# Patient Record
Sex: MALE | Race: WHITE
[De-identification: names, ages, dates, MRNs, and addresses within clinical notes are randomized per-mention and may not be internally consistent; named-entity substitution may affect disease eponyms.]

---

## 2017-03-23 ENCOUNTER — HOSPITAL ENCOUNTER (OUTPATIENT)
Dept: HOSPITAL 83 - LAB | Age: 27
Discharge: HOME | End: 2017-03-23
Attending: INTERNAL MEDICINE
Payer: COMMERCIAL

## 2017-03-23 DIAGNOSIS — F33.1: ICD-10-CM

## 2017-03-23 DIAGNOSIS — E10.9: Primary | ICD-10-CM

## 2017-03-23 LAB
ALBUMIN SERPL-MCNC: 3.7 GM/DL (ref 3.1–4.5)
ALP SERPL-CCNC: 64 U/L (ref 45–117)
ALT SERPL W P-5'-P-CCNC: 21 U/L (ref 12–78)
AST SERPL-CCNC: 16 IU/L (ref 3–35)
BASOPHILS # BLD AUTO: 0 10*3/UL (ref 0–0.1)
BASOPHILS NFR BLD AUTO: 0.3 % (ref 0–1)
BUN SERPL-MCNC: 12 MG/DL (ref 7–24)
CHLORIDE SERPL-SCNC: 99 MMOL/L (ref 98–107)
CHOLEST SERPL-MCNC: 126 MG/DL (ref ?–200)
CO2 SERPL-SCNC: 29 MMOL/L (ref 21–32)
EOSINOPHIL # BLD AUTO: 0 10*3/UL (ref 0–0.4)
EOSINOPHIL # BLD AUTO: 0.1 % (ref 1–4)
ERYTHROCYTE [DISTWIDTH] IN BLOOD BY AUTOMATED COUNT: 12.5 % (ref 0–14.5)
EST. AVERAGE GLUCOSE BLD GHB EST-MCNC: 206 MG/DL
GLUCOSE SERPL-MCNC: 451 MG/DL (ref 65–99)
HCT VFR BLD AUTO: 44.1 % (ref 42–52)
HDLC SERPL-MCNC: 70 MG/DL (ref 40–60)
HGB BLD-MCNC: 14.4 G/DL (ref 14–18)
IG #: 0.1 10*3/UL (ref 0–0.1)
LDLC SERPL DIRECT ASSAY-MCNC: 41 MG/DL (ref 9–159)
LYMPHOCYTES # BLD AUTO: 1 10*3/UL (ref 1.3–4.4)
LYMPHOCYTES NFR BLD AUTO: 7.1 % (ref 27–41)
MCH RBC QN AUTO: 29.3 PG (ref 27–31)
MCHC RBC AUTO-ENTMCNC: 32.7 G/DL (ref 33–37)
MCV RBC AUTO: 89.6 FL (ref 80–94)
MONOCYTES # BLD AUTO: 0.4 10*3/UL (ref 0.1–1)
MONOCYTES NFR BLD MANUAL: 3.1 % (ref 3–9)
NEUT #: 12.8 10*3/UL (ref 2.3–7.9)
NEUT %: 89 % (ref 47–73)
NRBC BLD QL AUTO: 0 % (ref 0–0)
PLATELET # BLD AUTO: 236 10*3/UL (ref 130–400)
PMV BLD AUTO: 9.4 FL (ref 9.6–12.3)
POTASSIUM SERPL-SCNC: 4.2 MMOL/L (ref 3.5–5.1)
PROT SERPL-MCNC: 6.7 GM/DL (ref 6.4–8.2)
RBC # BLD AUTO: 4.92 10*6/UL (ref 4.5–5.9)
SODIUM SERPL-SCNC: 137 MMOL/L (ref 136–145)
TRIGL SERPL-MCNC: 77 MG/DL (ref ?–150)
TSH SERPL DL<=0.005 MIU/L-ACNC: 1.11 UIU/ML (ref 0.36–4.75)
VLDLC SERPL CALC-MCNC: 15 MG/DL (ref 6–40)
WBC NRBC COR # BLD AUTO: 14.4 10*3/UL (ref 4.8–10.8)

## 2017-09-18 ENCOUNTER — HOSPITAL ENCOUNTER (INPATIENT)
Dept: HOSPITAL 83 - ED | Age: 27
LOS: 1 days | Discharge: HOME | DRG: 638 | End: 2017-09-19
Attending: INTERNAL MEDICINE | Admitting: INTERNAL MEDICINE
Payer: COMMERCIAL

## 2017-09-18 VITALS — DIASTOLIC BLOOD PRESSURE: 81 MMHG | SYSTOLIC BLOOD PRESSURE: 136 MMHG

## 2017-09-18 VITALS — DIASTOLIC BLOOD PRESSURE: 72 MMHG

## 2017-09-18 VITALS — SYSTOLIC BLOOD PRESSURE: 125 MMHG | DIASTOLIC BLOOD PRESSURE: 85 MMHG

## 2017-09-18 VITALS — HEIGHT: 65 IN | WEIGHT: 134 LBS | BODY MASS INDEX: 22.33 KG/M2

## 2017-09-18 VITALS — DIASTOLIC BLOOD PRESSURE: 69 MMHG | SYSTOLIC BLOOD PRESSURE: 129 MMHG

## 2017-09-18 VITALS — DIASTOLIC BLOOD PRESSURE: 85 MMHG

## 2017-09-18 VITALS — DIASTOLIC BLOOD PRESSURE: 79 MMHG

## 2017-09-18 DIAGNOSIS — E87.1: ICD-10-CM

## 2017-09-18 DIAGNOSIS — Z79.84: ICD-10-CM

## 2017-09-18 DIAGNOSIS — E87.8: ICD-10-CM

## 2017-09-18 DIAGNOSIS — E10.65: Primary | ICD-10-CM

## 2017-09-18 DIAGNOSIS — E80.6: ICD-10-CM

## 2017-09-18 DIAGNOSIS — Z79.899: ICD-10-CM

## 2017-09-18 DIAGNOSIS — E86.0: ICD-10-CM

## 2017-09-18 DIAGNOSIS — R00.0: ICD-10-CM

## 2017-09-18 LAB
ALBUMIN SERPL-MCNC: 4.7 GM/DL (ref 3.1–4.5)
ALP SERPL-CCNC: 83 U/L (ref 45–117)
ALT SERPL W P-5'-P-CCNC: 18 U/L (ref 12–78)
AST SERPL-CCNC: 15 IU/L (ref 3–35)
BASOPHILS # BLD AUTO: 0 10*3/UL (ref 0–0.1)
BASOPHILS NFR BLD AUTO: 0.5 % (ref 0–1)
BUN SERPL-MCNC: 24 MG/DL (ref 7–24)
CHLORIDE SERPL-SCNC: 94 MMOL/L (ref 98–107)
CREAT SERPL-MCNC: 1.17 MG/DL (ref 0.7–1.3)
EOSINOPHIL # BLD AUTO: 0 10*3/UL (ref 0–0.4)
EOSINOPHIL # BLD AUTO: 0.1 % (ref 1–4)
ERYTHROCYTE [DISTWIDTH] IN BLOOD BY AUTOMATED COUNT: 12.4 % (ref 0–14.5)
HCT VFR BLD AUTO: 46.4 % (ref 42–52)
HGB BLD-MCNC: 15.4 G/DL (ref 14–18)
LIPASE SERPL-CCNC: 75 U/L (ref 73–393)
LYMPHOCYTES # BLD AUTO: 1.1 10*3/UL (ref 1.3–4.4)
LYMPHOCYTES NFR BLD AUTO: 14.1 % (ref 27–41)
MCH RBC QN AUTO: 28.8 PG (ref 27–31)
MCHC RBC AUTO-ENTMCNC: 33.2 G/DL (ref 33–37)
MCV RBC AUTO: 86.9 FL (ref 80–94)
MONOCYTES # BLD AUTO: 0.4 10*3/UL (ref 0.1–1)
MONOCYTES NFR BLD MANUAL: 4.7 % (ref 3–9)
NEUT #: 6.4 10*3/UL (ref 2.3–7.9)
NEUT %: 80.2 % (ref 47–73)
NRBC BLD QL AUTO: 0 10*3/UL (ref 0–0)
PLATELET # BLD AUTO: 250 10*3/UL (ref 130–400)
PMV BLD AUTO: 9.8 FL (ref 9.6–12.3)
POTASSIUM SERPL-SCNC: 5 MMOL/L (ref 3.5–5.1)
PROT SERPL-MCNC: 8.3 GM/DL (ref 6.4–8.2)
RBC # BLD AUTO: 5.34 10*6/UL (ref 4.5–5.9)
SODIUM SERPL-SCNC: 134 MMOL/L (ref 136–145)
WBC NRBC COR # BLD AUTO: 8 10*3/UL (ref 4.8–10.8)

## 2017-09-19 VITALS — DIASTOLIC BLOOD PRESSURE: 71 MMHG

## 2017-09-19 VITALS — SYSTOLIC BLOOD PRESSURE: 110 MMHG | DIASTOLIC BLOOD PRESSURE: 72 MMHG

## 2017-09-19 LAB
25(OH)D3 SERPL-MCNC: 27.2 NG/ML (ref 30–100)
ALBUMIN SERPL-MCNC: 3.3 GM/DL (ref 3.1–4.5)
ALP SERPL-CCNC: 67 U/L (ref 45–117)
ALT SERPL W P-5'-P-CCNC: 15 U/L (ref 12–78)
APPEARANCE UR: CLEAR
APTT PPP: 24 SECONDS (ref 20.8–31.5)
AST SERPL-CCNC: 11 IU/L (ref 3–35)
BASOPHILS # BLD AUTO: 0 10*3/UL (ref 0–0.1)
BASOPHILS NFR BLD AUTO: 0.4 % (ref 0–1)
BILIRUB UR QL STRIP: NEGATIVE
BUN SERPL-MCNC: 14 MG/DL (ref 7–24)
CHLORIDE SERPL-SCNC: 103 MMOL/L (ref 98–107)
CHOLEST SERPL-MCNC: 93 MG/DL (ref ?–200)
COLOR UR: YELLOW
CREAT SERPL-MCNC: 0.9 MG/DL (ref 0.7–1.3)
EOSINOPHIL # BLD AUTO: 0 10*3/UL (ref 0–0.4)
EOSINOPHIL # BLD AUTO: 0.5 % (ref 1–4)
EPI CELLS #/AREA URNS HPF: (no result) /[HPF]
ERYTHROCYTE [DISTWIDTH] IN BLOOD BY AUTOMATED COUNT: 12.6 % (ref 0–14.5)
GLUCOSE UR QL: (no result)
HCT VFR BLD AUTO: 38.4 % (ref 42–52)
HDLC SERPL-MCNC: 47 MG/DL (ref 40–60)
HGB BLD-MCNC: 12.9 G/DL (ref 14–18)
HGB UR QL STRIP: NEGATIVE
INR BLD: 1.2 (ref 2–3.5)
KETONES UR QL STRIP: (no result)
LDLC SERPL DIRECT ASSAY-MCNC: 32 MG/DL (ref 9–159)
LEUKOCYTE ESTERASE UR QL STRIP: NEGATIVE
LYMPHOCYTES # BLD AUTO: 1.6 10*3/UL (ref 1.3–4.4)
LYMPHOCYTES NFR BLD AUTO: 21.2 % (ref 27–41)
MAGNESIUM SERPL-MCNC: 2 MG/DL (ref 1.5–2.1)
MCH RBC QN AUTO: 29.6 PG (ref 27–31)
MCHC RBC AUTO-ENTMCNC: 33.6 G/DL (ref 33–37)
MCV RBC AUTO: 88.1 FL (ref 80–94)
MONOCYTES # BLD AUTO: 0.4 10*3/UL (ref 0.1–1)
MONOCYTES NFR BLD MANUAL: 5.3 % (ref 3–9)
NEUT #: 5.4 10*3/UL (ref 2.3–7.9)
NEUT %: 72.3 % (ref 47–73)
NITRITE UR QL STRIP: NEGATIVE
NRBC BLD QL AUTO: 0 10*3/UL (ref 0–0)
PH UR STRIP: 5.5 [PH] (ref 5–9)
PHOSPHATE SERPL-MCNC: 2.3 MG/DL (ref 2.5–4.9)
PLATELET # BLD AUTO: 204 10*3/UL (ref 130–400)
PMV BLD AUTO: 9.5 FL (ref 9.6–12.3)
POTASSIUM SERPL-SCNC: 4.2 MMOL/L (ref 3.5–5.1)
PROT SERPL-MCNC: 5.9 GM/DL (ref 6.4–8.2)
RBC # BLD AUTO: 4.36 10*6/UL (ref 4.5–5.9)
SODIUM SERPL-SCNC: 136 MMOL/L (ref 136–145)
SP GR UR: 1.01 (ref 1–1.03)
TRIGL SERPL-MCNC: 70 MG/DL (ref ?–150)
TSH SERPL DL<=0.005 MIU/L-ACNC: 0.83 UIU/ML (ref 0.36–4.75)
UROBILINOGEN UR STRIP-MCNC: 0.2 E.U./DL (ref 0.2–1)
VITAMIN B12: 531 PG/ML (ref 247–911)
VLDLC SERPL CALC-MCNC: 14 MG/DL (ref 6–40)
WBC NRBC COR # BLD AUTO: 7.5 10*3/UL (ref 4.8–10.8)

## 2018-01-16 ENCOUNTER — HOSPITAL ENCOUNTER (EMERGENCY)
Dept: HOSPITAL 83 - ED | Age: 28
Discharge: HOME | End: 2018-01-16
Payer: COMMERCIAL

## 2018-01-16 VITALS — WEIGHT: 135 LBS | HEIGHT: 65.98 IN | BODY MASS INDEX: 21.69 KG/M2
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
## 2018-01-16 DIAGNOSIS — Z79.4: ICD-10-CM

## 2018-01-16 DIAGNOSIS — E78.00: ICD-10-CM
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
## 2018-01-16 DIAGNOSIS — E10.65: ICD-10-CM

## 2018-01-16 DIAGNOSIS — S40.012A: Primary | ICD-10-CM
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
## 2018-01-16 DIAGNOSIS — E87.1: ICD-10-CM

## 2018-01-16 DIAGNOSIS — Z79.899: ICD-10-CM

## 2018-02-25 ENCOUNTER — HOSPITAL ENCOUNTER (INPATIENT)
Dept: HOSPITAL 83 - ED | Age: 28
LOS: 1 days | Discharge: HOME | DRG: 637 | End: 2018-02-26
Attending: INTERNAL MEDICINE | Admitting: INTERNAL MEDICINE
Payer: COMMERCIAL

## 2018-02-25 VITALS — SYSTOLIC BLOOD PRESSURE: 131 MMHG | DIASTOLIC BLOOD PRESSURE: 77 MMHG

## 2018-02-25 VITALS — BODY MASS INDEX: 20.55 KG/M2 | WEIGHT: 127.9 LBS | HEIGHT: 65.98 IN

## 2018-02-25 VITALS — DIASTOLIC BLOOD PRESSURE: 80 MMHG | SYSTOLIC BLOOD PRESSURE: 138 MMHG

## 2018-02-25 VITALS — DIASTOLIC BLOOD PRESSURE: 85 MMHG

## 2018-02-25 VITALS — DIASTOLIC BLOOD PRESSURE: 94 MMHG

## 2018-02-25 DIAGNOSIS — Z79.4: ICD-10-CM

## 2018-02-25 DIAGNOSIS — R65.10: ICD-10-CM

## 2018-02-25 DIAGNOSIS — E87.1: ICD-10-CM

## 2018-02-25 DIAGNOSIS — E10.10: Primary | ICD-10-CM

## 2018-02-25 DIAGNOSIS — R00.0: ICD-10-CM

## 2018-02-25 DIAGNOSIS — N17.0: ICD-10-CM

## 2018-02-25 DIAGNOSIS — D72.829: ICD-10-CM

## 2018-02-25 DIAGNOSIS — Z79.899: ICD-10-CM

## 2018-02-25 DIAGNOSIS — F32.9: ICD-10-CM

## 2018-02-25 DIAGNOSIS — E87.5: ICD-10-CM

## 2018-02-25 DIAGNOSIS — D72.810: ICD-10-CM

## 2018-02-25 DIAGNOSIS — D64.9: ICD-10-CM

## 2018-02-25 LAB
ALBUMIN SERPL-MCNC: 5 GM/DL (ref 3.1–4.5)
ALP SERPL-CCNC: 106 U/L (ref 45–117)
ALT SERPL W P-5'-P-CCNC: 26 U/L (ref 12–78)
APPEARANCE UR: CLEAR
AST SERPL-CCNC: 22 IU/L (ref 3–35)
BASE EXCESS BLDA CALC-SCNC: -14.5 MMOL/L (ref -2–2)
BASOPHILS # BLD AUTO: 0.1 10*3/UL (ref 0–0.1)
BASOPHILS NFR BLD AUTO: 0.3 % (ref 0–1)
BILIRUB UR QL STRIP: NEGATIVE
BUN SERPL-MCNC: 19 MG/DL (ref 7–24)
BUN SERPL-MCNC: 22 MG/DL (ref 7–24)
CHLORIDE SERPL-SCNC: 102 MMOL/L (ref 98–107)
CHLORIDE SERPL-SCNC: 97 MMOL/L (ref 98–107)
COLOR UR: YELLOW
CREAT SERPL-MCNC: 1 MG/DL (ref 0.7–1.3)
CREAT SERPL-MCNC: 1.51 MG/DL (ref 0.7–1.3)
EOSINOPHIL # BLD AUTO: 0 % (ref 1–4)
EOSINOPHIL # BLD AUTO: 0 10*3/UL (ref 0–0.4)
ERYTHROCYTE [DISTWIDTH] IN BLOOD BY AUTOMATED COUNT: 12.9 % (ref 0–14.5)
GLUCOSE UR QL: (no result)
HCO3 BLDA-SCNC: 14.5 MMOL/L (ref 22–26)
HCT VFR BLD AUTO: 50.6 % (ref 42–52)
HGB BLD-MCNC: 16.3 G/DL (ref 14–18)
HGB UR QL STRIP: (no result)
KETONES UR QL STRIP: (no result)
LEUKOCYTE ESTERASE UR QL STRIP: NEGATIVE
LIPASE SERPL-CCNC: 55 U/L (ref 73–393)
LYMPHOCYTES # BLD AUTO: 1.2 10*3/UL (ref 1.3–4.4)
LYMPHOCYTES NFR BLD AUTO: 6.1 % (ref 27–41)
MCH RBC QN AUTO: 29.4 PG (ref 27–31)
MCHC RBC AUTO-ENTMCNC: 32.2 G/DL (ref 33–37)
MCV RBC AUTO: 91.3 FL (ref 80–94)
MONOCYTES # BLD AUTO: 1.1 10*3/UL (ref 0.1–1)
MONOCYTES NFR BLD MANUAL: 5.5 % (ref 3–9)
NEUT #: 17.2 10*3/UL (ref 2.3–7.9)
NEUT %: 86.9 % (ref 47–73)
NITRITE UR QL STRIP: NEGATIVE
NRBC BLD QL AUTO: 0 10*3/UL (ref 0–0)
PCO2 BLDA: 44.5 MMHG (ref 35–45)
PH BLDA: 7.14 [PH] (ref 7.35–7.45)
PH UR STRIP: 5.5 [PH] (ref 5–9)
PLATELET # BLD AUTO: 267 10*3/UL (ref 130–400)
PMV BLD AUTO: 9.4 FL (ref 9.6–12.3)
PO2 BLDA: 46.1 MMHG (ref 80–90)
POTASSIUM SERPL-SCNC: 4.6 MMOL/L (ref 3.5–5.1)
POTASSIUM SERPL-SCNC: 4.7 MMOL/L (ref 3.5–5.1)
PROT SERPL-MCNC: 8.8 GM/DL (ref 6.4–8.2)
RBC # BLD AUTO: 5.54 10*6/UL (ref 4.5–5.9)
RBC #/AREA URNS HPF: (no result) RBC/HPF (ref 0–2)
SAO2 % BLDA: 73.3 % (ref 95–97)
SODIUM SERPL-SCNC: 135 MMOL/L (ref 136–145)
SODIUM SERPL-SCNC: 137 MMOL/L (ref 136–145)
SP GR UR: 1.02 (ref 1–1.03)
TROPONIN I SERPL-MCNC: < 0.015 NG/ML (ref ?–0.04)
UROBILINOGEN UR STRIP-MCNC: 0.2 E.U./DL (ref 0.2–1)
WBC NRBC COR # BLD AUTO: 19.7 10*3/UL (ref 4.8–10.8)

## 2018-02-26 VITALS — SYSTOLIC BLOOD PRESSURE: 100 MMHG | DIASTOLIC BLOOD PRESSURE: 60 MMHG

## 2018-02-26 VITALS — DIASTOLIC BLOOD PRESSURE: 64 MMHG

## 2018-02-26 VITALS — DIASTOLIC BLOOD PRESSURE: 57 MMHG

## 2018-02-26 VITALS — DIASTOLIC BLOOD PRESSURE: 60 MMHG

## 2018-02-26 LAB
25(OH)D3 SERPL-MCNC: 10.3 NG/ML (ref 30–100)
ALBUMIN SERPL-MCNC: 3.3 GM/DL (ref 3.1–4.5)
ALP SERPL-CCNC: 70 U/L (ref 45–117)
ALT SERPL W P-5'-P-CCNC: 18 U/L (ref 12–78)
AST SERPL-CCNC: 13 IU/L (ref 3–35)
BASOPHILS # BLD AUTO: 0 10*3/UL (ref 0–0.1)
BASOPHILS NFR BLD AUTO: 0.3 % (ref 0–1)
BUN SERPL-MCNC: 14 MG/DL (ref 7–24)
BUN SERPL-MCNC: 14 MG/DL (ref 7–24)
BUN SERPL-MCNC: 16 MG/DL (ref 7–24)
BUN SERPL-MCNC: 16 MG/DL (ref 7–24)
CHLORIDE SERPL-SCNC: 101 MMOL/L (ref 98–107)
CHLORIDE SERPL-SCNC: 101 MMOL/L (ref 98–107)
CHLORIDE SERPL-SCNC: 102 MMOL/L (ref 98–107)
CHLORIDE SERPL-SCNC: 105 MMOL/L (ref 98–107)
CHOLEST SERPL-MCNC: 100 MG/DL (ref ?–200)
CREAT SERPL-MCNC: 0.89 MG/DL (ref 0.7–1.3)
CREAT SERPL-MCNC: 0.9 MG/DL (ref 0.7–1.3)
CREAT SERPL-MCNC: 0.97 MG/DL (ref 0.7–1.3)
CREAT SERPL-MCNC: 0.98 MG/DL (ref 0.7–1.3)
EOSINOPHIL # BLD AUTO: 0 % (ref 1–4)
EOSINOPHIL # BLD AUTO: 0 10*3/UL (ref 0–0.4)
ERYTHROCYTE [DISTWIDTH] IN BLOOD BY AUTOMATED COUNT: 13 % (ref 0–14.5)
HCT VFR BLD AUTO: 38.6 % (ref 42–52)
HDLC SERPL-MCNC: 56 MG/DL (ref 40–60)
HGB BLD-MCNC: 12.9 G/DL (ref 14–18)
LDLC SERPL DIRECT ASSAY-MCNC: 32 MG/DL (ref 9–159)
LYMPHOCYTES # BLD AUTO: 2.2 10*3/UL (ref 1.3–4.4)
LYMPHOCYTES NFR BLD AUTO: 19 % (ref 27–41)
MCH RBC QN AUTO: 29.4 PG (ref 27–31)
MCHC RBC AUTO-ENTMCNC: 33.4 G/DL (ref 33–37)
MCV RBC AUTO: 87.9 FL (ref 80–94)
MONOCYTES # BLD AUTO: 0.7 10*3/UL (ref 0.1–1)
MONOCYTES NFR BLD MANUAL: 6 % (ref 3–9)
NEUT #: 8.8 10*3/UL (ref 2.3–7.9)
NEUT %: 74.4 % (ref 47–73)
NRBC BLD QL AUTO: 0 10*3/UL (ref 0–0)
PHOSPHATE SERPL-MCNC: 2.7 MG/DL (ref 2.5–4.9)
PLATELET # BLD AUTO: 232 10*3/UL (ref 130–400)
PMV BLD AUTO: 9.4 FL (ref 9.6–12.3)
POTASSIUM SERPL-SCNC: 3.9 MMOL/L (ref 3.5–5.1)
POTASSIUM SERPL-SCNC: 4.2 MMOL/L (ref 3.5–5.1)
POTASSIUM SERPL-SCNC: 4.7 MMOL/L (ref 3.5–5.1)
POTASSIUM SERPL-SCNC: 5.5 MMOL/L (ref 3.5–5.1)
PROT SERPL-MCNC: 5.9 GM/DL (ref 6.4–8.2)
RBC # BLD AUTO: 4.39 10*6/UL (ref 4.5–5.9)
SODIUM SERPL-SCNC: 133 MMOL/L (ref 136–145)
SODIUM SERPL-SCNC: 133 MMOL/L (ref 136–145)
SODIUM SERPL-SCNC: 136 MMOL/L (ref 136–145)
SODIUM SERPL-SCNC: 136 MMOL/L (ref 136–145)
T4 FREE SERPL-MCNC: 1.06 NG/DL (ref 0.76–1.46)
TRIGL SERPL-MCNC: 61 MG/DL (ref ?–150)
TSH SERPL DL<=0.005 MIU/L-ACNC: 0.56 UIU/ML (ref 0.36–4.75)
VITAMIN B12: 429 PG/ML (ref 247–911)
VLDLC SERPL CALC-MCNC: 12 MG/DL (ref 6–40)
WBC NRBC COR # BLD AUTO: 11.8 10*3/UL (ref 4.8–10.8)

## 2018-04-02 ENCOUNTER — HOSPITAL ENCOUNTER (EMERGENCY)
Dept: HOSPITAL 83 - ED | Age: 28
Discharge: HOME | End: 2018-04-02
Payer: COMMERCIAL

## 2018-04-02 VITALS — HEIGHT: 51.18 IN

## 2018-04-02 DIAGNOSIS — E10.65: ICD-10-CM

## 2018-04-02 DIAGNOSIS — Z79.899: ICD-10-CM

## 2018-04-02 DIAGNOSIS — H10.9: Primary | ICD-10-CM

## 2018-04-02 DIAGNOSIS — Z79.4: ICD-10-CM

## 2018-05-07 ENCOUNTER — HOSPITAL ENCOUNTER (EMERGENCY)
Dept: HOSPITAL 83 - ED | Age: 28
Discharge: HOME | End: 2018-05-07
Payer: COMMERCIAL

## 2018-05-07 VITALS — HEIGHT: 65 IN | WEIGHT: 132 LBS | BODY MASS INDEX: 21.99 KG/M2

## 2018-05-07 DIAGNOSIS — R03.0: ICD-10-CM

## 2018-05-07 DIAGNOSIS — E11.10: ICD-10-CM
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
## 2018-05-07 DIAGNOSIS — Z79.899: ICD-10-CM

## 2018-05-07 DIAGNOSIS — S61.012A: Primary | ICD-10-CM
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
## 2018-05-07 DIAGNOSIS — Z79.4: ICD-10-CM

## 2018-07-24 ENCOUNTER — HOSPITAL ENCOUNTER (EMERGENCY)
Dept: HOSPITAL 83 - ED | Age: 28
LOS: 1 days | Discharge: HOME | End: 2018-07-25
Payer: COMMERCIAL

## 2018-07-24 VITALS — WEIGHT: 132 LBS | BODY MASS INDEX: 21.99 KG/M2 | HEIGHT: 65 IN
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
## 2018-07-24 DIAGNOSIS — S69.91XA: Primary | ICD-10-CM
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
## 2018-07-24 DIAGNOSIS — Z79.899: ICD-10-CM

## 2018-11-02 ENCOUNTER — HOSPITAL ENCOUNTER (EMERGENCY)
Dept: HOSPITAL 83 - ED | Age: 28
Discharge: HOME | End: 2018-11-02
Payer: COMMERCIAL

## 2018-11-02 VITALS — WEIGHT: 134 LBS | HEIGHT: 60 IN

## 2018-11-02 DIAGNOSIS — Z79.4: ICD-10-CM

## 2018-11-02 DIAGNOSIS — Z79.899: ICD-10-CM

## 2018-11-02 DIAGNOSIS — R11.2: Primary | ICD-10-CM

## 2018-11-02 DIAGNOSIS — E10.9: ICD-10-CM

## 2018-11-02 LAB
ALBUMIN SERPL-MCNC: 4.1 GM/DL (ref 3.1–4.5)
ALP SERPL-CCNC: 83 U/L (ref 45–117)
ALT SERPL W P-5'-P-CCNC: 19 U/L (ref 12–78)
APPEARANCE UR: CLEAR
AST SERPL-CCNC: 12 IU/L (ref 3–35)
BASOPHILS # BLD AUTO: 0 10*3/UL (ref 0–0.1)
BASOPHILS NFR BLD AUTO: 0.4 % (ref 0–1)
BILIRUB UR QL STRIP: (no result)
BUN SERPL-MCNC: 17 MG/DL (ref 7–24)
CHLORIDE SERPL-SCNC: 103 MMOL/L (ref 98–107)
COLOR UR: YELLOW
CREAT SERPL-MCNC: 1.06 MG/DL (ref 0.7–1.3)
EOSINOPHIL # BLD AUTO: 0 % (ref 1–4)
EOSINOPHIL # BLD AUTO: 0 10*3/UL (ref 0–0.4)
EPI CELLS #/AREA URNS HPF: (no result) /[HPF]
ERYTHROCYTE [DISTWIDTH] IN BLOOD BY AUTOMATED COUNT: 12.2 % (ref 0–14.5)
GLUCOSE UR QL: (no result)
HCT VFR BLD AUTO: 44.8 % (ref 42–52)
HGB BLD-MCNC: 15.3 G/DL (ref 14–18)
HGB UR QL STRIP: (no result)
KETONES UR QL STRIP: (no result)
LEUKOCYTE ESTERASE UR QL STRIP: NEGATIVE
LIPASE SERPL-CCNC: 74 U/L (ref 73–393)
LYMPHOCYTES # BLD AUTO: 1 10*3/UL (ref 1.3–4.4)
LYMPHOCYTES NFR BLD AUTO: 11.2 % (ref 27–41)
MCH RBC QN AUTO: 29.4 PG (ref 27–31)
MCHC RBC AUTO-ENTMCNC: 34.2 G/DL (ref 33–37)
MCV RBC AUTO: 86 FL (ref 80–94)
MONOCYTES # BLD AUTO: 0.4 10*3/UL (ref 0.1–1)
MONOCYTES NFR BLD MANUAL: 4.5 % (ref 3–9)
NEUT #: 7.1 10*3/UL (ref 2.3–7.9)
NEUT %: 83.7 % (ref 47–73)
NITRITE UR QL STRIP: NEGATIVE
NRBC BLD QL AUTO: 0 10*3/UL (ref 0–0)
PH UR STRIP: 6 [PH] (ref 5–9)
PLATELET # BLD AUTO: 225 10*3/UL (ref 130–400)
PMV BLD AUTO: 9.5 FL (ref 9.6–12.3)
POTASSIUM SERPL-SCNC: 4.1 MMOL/L (ref 3.5–5.1)
PROT SERPL-MCNC: 7.5 GM/DL (ref 6.4–8.2)
RBC # BLD AUTO: 5.21 10*6/UL (ref 4.5–5.9)
RBC #/AREA URNS HPF: (no result) RBC/HPF (ref 0–2)
SODIUM SERPL-SCNC: 135 MMOL/L (ref 136–145)
SP GR UR: >= 1.03 (ref 1–1.03)
UROBILINOGEN UR STRIP-MCNC: 1 E.U./DL (ref 0.2–1)
WBC #/AREA URNS HPF: (no result) WBC/HPF (ref 0–5)
WBC NRBC COR # BLD AUTO: 8.5 10*3/UL (ref 4.8–10.8)

## 2020-05-28 ENCOUNTER — HOSPITAL ENCOUNTER (EMERGENCY)
Dept: HOSPITAL 83 - ED | Age: 30
Discharge: HOME | End: 2020-05-28
Payer: COMMERCIAL

## 2020-05-28 VITALS — WEIGHT: 122 LBS | HEIGHT: 65.98 IN | BODY MASS INDEX: 19.61 KG/M2

## 2020-05-28 DIAGNOSIS — Z79.899: ICD-10-CM

## 2020-05-28 DIAGNOSIS — R55: Primary | ICD-10-CM

## 2020-05-28 LAB
ALBUMIN SERPL-MCNC: 4 GM/DL (ref 3.1–4.5)
ALP SERPL-CCNC: 89 U/L (ref 45–117)
ALT SERPL W P-5'-P-CCNC: 20 U/L (ref 12–78)
AST SERPL-CCNC: 14 IU/L (ref 3–35)
BASOPHILS # BLD AUTO: 0 10*3/UL (ref 0–0.1)
BASOPHILS NFR BLD AUTO: 0.9 % (ref 0–1)
BUN SERPL-MCNC: 11 MG/DL (ref 7–24)
CHLORIDE SERPL-SCNC: 107 MMOL/L (ref 98–107)
CREAT SERPL-MCNC: 1.11 MG/DL (ref 0.7–1.3)
EOSINOPHIL # BLD AUTO: 0.1 10*3/UL (ref 0–0.4)
EOSINOPHIL # BLD AUTO: 1.9 % (ref 1–4)
ERYTHROCYTE [DISTWIDTH] IN BLOOD BY AUTOMATED COUNT: 12.4 % (ref 0–14.5)
HCT VFR BLD AUTO: 46.3 % (ref 42–52)
LYMPHOCYTES # BLD AUTO: 2.3 10*3/UL (ref 1.3–4.4)
LYMPHOCYTES NFR BLD AUTO: 48.9 % (ref 27–41)
MCH RBC QN AUTO: 29.2 PG (ref 27–31)
MCHC RBC AUTO-ENTMCNC: 33 G/DL (ref 33–37)
MCV RBC AUTO: 88.4 FL (ref 80–94)
MONOCYTES # BLD AUTO: 0.4 10*3/UL (ref 0.1–1)
MONOCYTES NFR BLD MANUAL: 9.4 % (ref 3–9)
NEUT #: 1.8 10*3/UL (ref 2.3–7.9)
NEUT %: 38.7 % (ref 47–73)
NRBC BLD QL AUTO: 0 10*3/UL (ref 0–0)
PLATELET # BLD AUTO: 218 10*3/UL (ref 130–400)
PMV BLD AUTO: 9.4 FL (ref 9.6–12.3)
POTASSIUM SERPL-SCNC: 4.1 MMOL/L (ref 3.5–5.1)
PROT SERPL-MCNC: 7.3 GM/DL (ref 6.4–8.2)
RBC # BLD AUTO: 5.24 10*6/UL (ref 4.5–5.9)
SODIUM SERPL-SCNC: 142 MMOL/L (ref 136–145)
TROPONIN I SERPL-MCNC: < 0.015 NG/ML (ref ?–0.04)
WBC NRBC COR # BLD AUTO: 4.7 10*3/UL (ref 4.8–10.8)

## 2020-11-05 ENCOUNTER — HOSPITAL ENCOUNTER (INPATIENT)
Age: 30
LOS: 1 days | Discharge: HOME | DRG: 347 | End: 2020-11-07
Attending: EMERGENCY MEDICINE | Admitting: SURGERY
Payer: MEDICAID

## 2020-11-05 ENCOUNTER — HOSPITAL ENCOUNTER (EMERGENCY)
Dept: HOSPITAL 83 - ED | Age: 30
Discharge: ANOTHER HOSPITAL | End: 2020-11-05
Payer: COMMERCIAL

## 2020-11-05 VITALS — HEIGHT: 70 IN | BODY MASS INDEX: 17.75 KG/M2 | WEIGHT: 124 LBS
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
## 2020-11-05 DIAGNOSIS — S22.048A: Primary | ICD-10-CM

## 2020-11-05 DIAGNOSIS — S22.059A: ICD-10-CM
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
## 2020-11-05 DIAGNOSIS — Z79.899: ICD-10-CM

## 2020-11-05 DIAGNOSIS — Z79.4: ICD-10-CM
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
## 2020-11-05 LAB
ALBUMIN SERPL-MCNC: 3.9 GM/DL (ref 3.1–4.5)
ALP SERPL-CCNC: 104 U/L (ref 45–117)
ALT SERPL W P-5'-P-CCNC: 25 U/L (ref 12–78)
AST SERPL-CCNC: 26 IU/L (ref 3–35)
BASOPHILS # BLD AUTO: 0 10*3/UL (ref 0–0.1)
BASOPHILS NFR BLD AUTO: 0.5 % (ref 0–1)
BUN SERPL-MCNC: 15 MG/DL (ref 7–24)
CHLORIDE SERPL-SCNC: 102 MMOL/L (ref 98–107)
CREAT SERPL-MCNC: 1.16 MG/DL (ref 0.7–1.3)
EOSINOPHIL # BLD AUTO: 0.1 10*3/UL (ref 0–0.4)
EOSINOPHIL # BLD AUTO: 1 % (ref 1–4)
ERYTHROCYTE [DISTWIDTH] IN BLOOD BY AUTOMATED COUNT: 12.5 % (ref 0–14.5)
HCT VFR BLD AUTO: 45.6 % (ref 42–52)
LYMPHOCYTES # BLD AUTO: 2 10*3/UL (ref 1.3–4.4)
LYMPHOCYTES NFR BLD AUTO: 33.8 % (ref 27–41)
MCH RBC QN AUTO: 28.8 PG (ref 27–31)
MCHC RBC AUTO-ENTMCNC: 32.7 G/DL (ref 33–37)
MCV RBC AUTO: 88.2 FL (ref 80–94)
MONOCYTES # BLD AUTO: 0.4 10*3/UL (ref 0.1–1)
MONOCYTES NFR BLD MANUAL: 6.6 % (ref 3–9)
NEUT #: 3.4 10*3/UL (ref 2.3–7.9)
NEUT %: 57.1 % (ref 47–73)
NRBC BLD QL AUTO: 0 10*3/UL (ref 0–0)
PLATELET # BLD AUTO: 244 10*3/UL (ref 130–400)
PMV BLD AUTO: 9.6 FL (ref 9.6–12.3)
POTASSIUM SERPL-SCNC: 3.8 MMOL/L (ref 3.5–5.1)
PROT SERPL-MCNC: 7.3 GM/DL (ref 6.4–8.2)
RBC # BLD AUTO: 5.17 10*6/UL (ref 4.5–5.9)
SODIUM SERPL-SCNC: 136 MMOL/L (ref 136–145)
WBC NRBC COR # BLD AUTO: 5.9 10*3/UL (ref 4.8–10.8)

## 2020-11-05 PROCEDURE — 99284 EMERGENCY DEPT VISIT MOD MDM: CPT

## 2020-11-05 PROCEDURE — 96374 THER/PROPH/DIAG INJ IV PUSH: CPT

## 2020-11-06 ENCOUNTER — APPOINTMENT (OUTPATIENT)
Dept: CT IMAGING | Age: 30
DRG: 347 | End: 2020-11-06
Payer: MEDICAID

## 2020-11-06 ENCOUNTER — APPOINTMENT (OUTPATIENT)
Dept: GENERAL RADIOLOGY | Age: 30
DRG: 347 | End: 2020-11-06
Payer: MEDICAID

## 2020-11-06 ENCOUNTER — APPOINTMENT (OUTPATIENT)
Dept: MRI IMAGING | Age: 30
DRG: 347 | End: 2020-11-06
Payer: MEDICAID
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
## 2020-11-06 PROBLEM — S22.030A COMPRESSION FRACTURE OF T3 VERTEBRA (HCC): Status: ACTIVE | Noted: 2020-11-06

## 2020-11-06 LAB
ALBUMIN SERPL-MCNC: 4 G/DL (ref 3.5–5.2)
ALBUMIN SERPL-MCNC: 4 G/DL (ref 3.5–5.2)
ALP BLD-CCNC: 92 U/L (ref 40–129)
ALP BLD-CCNC: 94 U/L (ref 40–129)
ALT SERPL-CCNC: 15 U/L (ref 0–40)
ALT SERPL-CCNC: 16 U/L (ref 0–40)
ANION GAP SERPL CALCULATED.3IONS-SCNC: 13 MMOL/L (ref 7–16)
ANION GAP SERPL CALCULATED.3IONS-SCNC: 8 MMOL/L (ref 7–16)
AST SERPL-CCNC: 21 U/L (ref 0–39)
AST SERPL-CCNC: 23 U/L (ref 0–39)
BASOPHILS ABSOLUTE: 0.03 E9/L (ref 0–0.2)
BASOPHILS ABSOLUTE: 0.04 E9/L (ref 0–0.2)
BASOPHILS RELATIVE PERCENT: 0.3 % (ref 0–2)
BASOPHILS RELATIVE PERCENT: 0.4 % (ref 0–2)
BILIRUB SERPL-MCNC: 1 MG/DL (ref 0–1.2)
BILIRUB SERPL-MCNC: 1 MG/DL (ref 0–1.2)
BUN BLDV-MCNC: 15 MG/DL (ref 6–20)
BUN BLDV-MCNC: 15 MG/DL (ref 6–20)
CALCIUM SERPL-MCNC: 8.8 MG/DL (ref 8.6–10.2)
CALCIUM SERPL-MCNC: 9 MG/DL (ref 8.6–10.2)
CHLORIDE BLD-SCNC: 94 MMOL/L (ref 98–107)
CHLORIDE BLD-SCNC: 95 MMOL/L (ref 98–107)
CO2: 22 MMOL/L (ref 22–29)
CO2: 25 MMOL/L (ref 22–29)
CREAT SERPL-MCNC: 0.8 MG/DL (ref 0.7–1.2)
CREAT SERPL-MCNC: 0.9 MG/DL (ref 0.7–1.2)
EOSINOPHILS ABSOLUTE: 0.01 E9/L (ref 0.05–0.5)
EOSINOPHILS ABSOLUTE: 0.01 E9/L (ref 0.05–0.5)
EOSINOPHILS RELATIVE PERCENT: 0.1 % (ref 0–6)
EOSINOPHILS RELATIVE PERCENT: 0.1 % (ref 0–6)
GFR AFRICAN AMERICAN: >60
GFR AFRICAN AMERICAN: >60
GFR NON-AFRICAN AMERICAN: >60 ML/MIN/1.73
GFR NON-AFRICAN AMERICAN: >60 ML/MIN/1.73
GLUCOSE BLD-MCNC: 325 MG/DL (ref 74–99)
GLUCOSE BLD-MCNC: 329 MG/DL (ref 74–99)
HCT VFR BLD CALC: 41.3 % (ref 37–54)
HCT VFR BLD CALC: 43.2 % (ref 37–54)
HEMOGLOBIN: 13.9 G/DL (ref 12.5–16.5)
HEMOGLOBIN: 14.1 G/DL (ref 12.5–16.5)
IMMATURE GRANULOCYTES #: 0.03 E9/L
IMMATURE GRANULOCYTES #: 0.03 E9/L
IMMATURE GRANULOCYTES %: 0.3 % (ref 0–5)
IMMATURE GRANULOCYTES %: 0.3 % (ref 0–5)
LACTIC ACID: 1.4 MMOL/L (ref 0.5–2.2)
LYMPHOCYTES ABSOLUTE: 1.31 E9/L (ref 1.5–4)
LYMPHOCYTES ABSOLUTE: 1.73 E9/L (ref 1.5–4)
LYMPHOCYTES RELATIVE PERCENT: 13.2 % (ref 20–42)
LYMPHOCYTES RELATIVE PERCENT: 18.2 % (ref 20–42)
MCH RBC QN AUTO: 28.4 PG (ref 26–35)
MCH RBC QN AUTO: 29.5 PG (ref 26–35)
MCHC RBC AUTO-ENTMCNC: 32.2 % (ref 32–34.5)
MCHC RBC AUTO-ENTMCNC: 34.1 % (ref 32–34.5)
MCV RBC AUTO: 86.4 FL (ref 80–99.9)
MCV RBC AUTO: 88.2 FL (ref 80–99.9)
METER GLUCOSE: 257 MG/DL (ref 74–99)
METER GLUCOSE: 260 MG/DL (ref 74–99)
METER GLUCOSE: 277 MG/DL (ref 74–99)
METER GLUCOSE: 284 MG/DL (ref 74–99)
MONOCYTES ABSOLUTE: 0.57 E9/L (ref 0.1–0.95)
MONOCYTES ABSOLUTE: 0.61 E9/L (ref 0.1–0.95)
MONOCYTES RELATIVE PERCENT: 5.7 % (ref 2–12)
MONOCYTES RELATIVE PERCENT: 6.4 % (ref 2–12)
NEUTROPHILS ABSOLUTE: 7.08 E9/L (ref 1.8–7.3)
NEUTROPHILS ABSOLUTE: 7.98 E9/L (ref 1.8–7.3)
NEUTROPHILS RELATIVE PERCENT: 74.7 % (ref 43–80)
NEUTROPHILS RELATIVE PERCENT: 80.3 % (ref 43–80)
PDW BLD-RTO: 12.7 FL (ref 11.5–15)
PDW BLD-RTO: 12.7 FL (ref 11.5–15)
PLATELET # BLD: 224 E9/L (ref 130–450)
PLATELET # BLD: 233 E9/L (ref 130–450)
PMV BLD AUTO: 10 FL (ref 7–12)
PMV BLD AUTO: 9.6 FL (ref 7–12)
POTASSIUM REFLEX MAGNESIUM: 4.6 MMOL/L (ref 3.5–5)
POTASSIUM SERPL-SCNC: 4.1 MMOL/L (ref 3.5–5)
RBC # BLD: 4.78 E12/L (ref 3.8–5.8)
RBC # BLD: 4.9 E12/L (ref 3.8–5.8)
SODIUM BLD-SCNC: 128 MMOL/L (ref 132–146)
SODIUM BLD-SCNC: 129 MMOL/L (ref 132–146)
TOTAL PROTEIN: 6.6 G/DL (ref 6.4–8.3)
TOTAL PROTEIN: 6.9 G/DL (ref 6.4–8.3)
WBC # BLD: 9.5 E9/L (ref 4.5–11.5)
WBC # BLD: 9.9 E9/L (ref 4.5–11.5)

## 2020-11-06 PROCEDURE — 85025 COMPLETE CBC W/AUTO DIFF WBC: CPT

## 2020-11-06 PROCEDURE — 6360000002 HC RX W HCPCS: Performed by: EMERGENCY MEDICINE

## 2020-11-06 PROCEDURE — 1200000000 HC SEMI PRIVATE

## 2020-11-06 PROCEDURE — 2580000003 HC RX 258: Performed by: STUDENT IN AN ORGANIZED HEALTH CARE EDUCATION/TRAINING PROGRAM

## 2020-11-06 PROCEDURE — 82962 GLUCOSE BLOOD TEST: CPT

## 2020-11-06 PROCEDURE — 99231 SBSQ HOSP IP/OBS SF/LOW 25: CPT | Performed by: SURGERY

## 2020-11-06 PROCEDURE — 96372 THER/PROPH/DIAG INJ SC/IM: CPT

## 2020-11-06 PROCEDURE — 99222 1ST HOSP IP/OBS MODERATE 55: CPT | Performed by: NEUROLOGICAL SURGERY

## 2020-11-06 PROCEDURE — G0378 HOSPITAL OBSERVATION PER HR: HCPCS

## 2020-11-06 PROCEDURE — 83605 ASSAY OF LACTIC ACID: CPT

## 2020-11-06 PROCEDURE — 96374 THER/PROPH/DIAG INJ IV PUSH: CPT

## 2020-11-06 PROCEDURE — 6360000002 HC RX W HCPCS: Performed by: STUDENT IN AN ORGANIZED HEALTH CARE EDUCATION/TRAINING PROGRAM

## 2020-11-06 PROCEDURE — 6370000000 HC RX 637 (ALT 250 FOR IP): Performed by: STUDENT IN AN ORGANIZED HEALTH CARE EDUCATION/TRAINING PROGRAM

## 2020-11-06 PROCEDURE — 72146 MRI CHEST SPINE W/O DYE: CPT

## 2020-11-06 PROCEDURE — 70030 X-RAY EYE FOR FOREIGN BODY: CPT

## 2020-11-06 PROCEDURE — 36415 COLL VENOUS BLD VENIPUNCTURE: CPT

## 2020-11-06 PROCEDURE — 6360000002 HC RX W HCPCS: Performed by: NEUROLOGICAL SURGERY

## 2020-11-06 PROCEDURE — 96375 TX/PRO/DX INJ NEW DRUG ADDON: CPT

## 2020-11-06 PROCEDURE — 6360000002 HC RX W HCPCS

## 2020-11-06 PROCEDURE — 6360000004 HC RX CONTRAST MEDICATION: Performed by: RADIOLOGY

## 2020-11-06 PROCEDURE — 74177 CT ABD & PELVIS W/CONTRAST: CPT

## 2020-11-06 PROCEDURE — 80053 COMPREHEN METABOLIC PANEL: CPT

## 2020-11-06 RX ORDER — FENTANYL CITRATE 50 UG/ML
50 INJECTION, SOLUTION INTRAMUSCULAR; INTRAVENOUS ONCE
Status: COMPLETED | OUTPATIENT
Start: 2020-11-06 | End: 2020-11-06

## 2020-11-06 RX ORDER — OXYCODONE HYDROCHLORIDE 5 MG/1
5 TABLET ORAL EVERY 4 HOURS PRN
Status: DISCONTINUED | OUTPATIENT
Start: 2020-11-06 | End: 2020-11-07 | Stop reason: HOSPADM

## 2020-11-06 RX ORDER — METHOCARBAMOL 500 MG/1
1000 TABLET, FILM COATED ORAL 4 TIMES DAILY
Status: DISCONTINUED | OUTPATIENT
Start: 2020-11-06 | End: 2020-11-07 | Stop reason: HOSPADM

## 2020-11-06 RX ORDER — OXYCODONE HYDROCHLORIDE 10 MG/1
10 TABLET ORAL EVERY 4 HOURS PRN
Status: DISCONTINUED | OUTPATIENT
Start: 2020-11-06 | End: 2020-11-07 | Stop reason: HOSPADM

## 2020-11-06 RX ORDER — SODIUM CHLORIDE, SODIUM LACTATE, POTASSIUM CHLORIDE, CALCIUM CHLORIDE 600; 310; 30; 20 MG/100ML; MG/100ML; MG/100ML; MG/100ML
INJECTION, SOLUTION INTRAVENOUS CONTINUOUS
Status: DISCONTINUED | OUTPATIENT
Start: 2020-11-06 | End: 2020-11-06

## 2020-11-06 RX ORDER — SODIUM CHLORIDE 0.9 % (FLUSH) 0.9 %
10 SYRINGE (ML) INJECTION EVERY 12 HOURS SCHEDULED
Status: DISCONTINUED | OUTPATIENT
Start: 2020-11-06 | End: 2020-11-07 | Stop reason: HOSPADM

## 2020-11-06 RX ORDER — POLYETHYLENE GLYCOL 3350 17 G/17G
17 POWDER, FOR SOLUTION ORAL DAILY PRN
Status: DISCONTINUED | OUTPATIENT
Start: 2020-11-06 | End: 2020-11-07 | Stop reason: HOSPADM

## 2020-11-06 RX ORDER — LORAZEPAM 2 MG/ML
1 INJECTION INTRAMUSCULAR ONCE
Status: COMPLETED | OUTPATIENT
Start: 2020-11-06 | End: 2020-11-06

## 2020-11-06 RX ORDER — FLUOXETINE 10 MG/1
60 TABLET, FILM COATED ORAL DAILY
COMMUNITY

## 2020-11-06 RX ORDER — ONDANSETRON 2 MG/ML
INJECTION INTRAMUSCULAR; INTRAVENOUS
Status: COMPLETED
Start: 2020-11-06 | End: 2020-11-06

## 2020-11-06 RX ORDER — SODIUM CHLORIDE 0.9 % (FLUSH) 0.9 %
10 SYRINGE (ML) INJECTION PRN
Status: DISCONTINUED | OUTPATIENT
Start: 2020-11-06 | End: 2020-11-07 | Stop reason: HOSPADM

## 2020-11-06 RX ORDER — DOCUSATE SODIUM 100 MG/1
100 CAPSULE, LIQUID FILLED ORAL 2 TIMES DAILY
Status: DISCONTINUED | OUTPATIENT
Start: 2020-11-06 | End: 2020-11-07 | Stop reason: HOSPADM

## 2020-11-06 RX ORDER — PROMETHAZINE HYDROCHLORIDE 25 MG/1
12.5 TABLET ORAL EVERY 6 HOURS PRN
Status: DISCONTINUED | OUTPATIENT
Start: 2020-11-06 | End: 2020-11-07 | Stop reason: HOSPADM

## 2020-11-06 RX ORDER — NICOTINE POLACRILEX 4 MG
15 LOZENGE BUCCAL PRN
Status: DISCONTINUED | OUTPATIENT
Start: 2020-11-06 | End: 2020-11-07 | Stop reason: HOSPADM

## 2020-11-06 RX ORDER — ACETAMINOPHEN 325 MG/1
650 TABLET ORAL EVERY 4 HOURS
Status: DISCONTINUED | OUTPATIENT
Start: 2020-11-06 | End: 2020-11-07 | Stop reason: HOSPADM

## 2020-11-06 RX ORDER — SODIUM CHLORIDE 0.9 % (FLUSH) 0.9 %
10 SYRINGE (ML) INJECTION PRN
Status: DISCONTINUED | OUTPATIENT
Start: 2020-11-06 | End: 2020-11-06

## 2020-11-06 RX ORDER — ONDANSETRON 2 MG/ML
4 INJECTION INTRAMUSCULAR; INTRAVENOUS EVERY 6 HOURS PRN
Status: DISCONTINUED | OUTPATIENT
Start: 2020-11-06 | End: 2020-11-07 | Stop reason: HOSPADM

## 2020-11-06 RX ORDER — PROCHLORPERAZINE EDISYLATE 5 MG/ML
10 INJECTION INTRAMUSCULAR; INTRAVENOUS EVERY 6 HOURS PRN
Status: DISCONTINUED | OUTPATIENT
Start: 2020-11-06 | End: 2020-11-07 | Stop reason: HOSPADM

## 2020-11-06 RX ORDER — SENNA PLUS 8.6 MG/1
2 TABLET ORAL NIGHTLY
Status: DISCONTINUED | OUTPATIENT
Start: 2020-11-06 | End: 2020-11-07 | Stop reason: HOSPADM

## 2020-11-06 RX ORDER — DEXTROSE MONOHYDRATE 25 G/50ML
12.5 INJECTION, SOLUTION INTRAVENOUS PRN
Status: DISCONTINUED | OUTPATIENT
Start: 2020-11-06 | End: 2020-11-07 | Stop reason: HOSPADM

## 2020-11-06 RX ORDER — DEXTROSE MONOHYDRATE 50 MG/ML
100 INJECTION, SOLUTION INTRAVENOUS PRN
Status: DISCONTINUED | OUTPATIENT
Start: 2020-11-06 | End: 2020-11-07 | Stop reason: HOSPADM

## 2020-11-06 RX ORDER — LIDOCAINE 4 G/G
1 PATCH TOPICAL DAILY
Status: DISCONTINUED | OUTPATIENT
Start: 2020-11-06 | End: 2020-11-07 | Stop reason: HOSPADM

## 2020-11-06 RX ADMIN — SODIUM CHLORIDE, PRESERVATIVE FREE 10 ML: 5 INJECTION INTRAVENOUS at 08:35

## 2020-11-06 RX ADMIN — ENOXAPARIN SODIUM 30 MG: 30 INJECTION SUBCUTANEOUS at 20:37

## 2020-11-06 RX ADMIN — ACETAMINOPHEN 650 MG: 325 TABLET ORAL at 20:30

## 2020-11-06 RX ADMIN — OXYCODONE HYDROCHLORIDE 10 MG: 10 TABLET ORAL at 13:51

## 2020-11-06 RX ADMIN — ACETAMINOPHEN 650 MG: 325 TABLET ORAL at 08:36

## 2020-11-06 RX ADMIN — METHOCARBAMOL TABLETS 1000 MG: 500 TABLET, COATED ORAL at 17:17

## 2020-11-06 RX ADMIN — ACETAMINOPHEN 650 MG: 325 TABLET ORAL at 15:30

## 2020-11-06 RX ADMIN — SENNOSIDES 17.2 MG: 8.6 TABLET, FILM COATED ORAL at 20:37

## 2020-11-06 RX ADMIN — SODIUM CHLORIDE, POTASSIUM CHLORIDE, SODIUM LACTATE AND CALCIUM CHLORIDE: 600; 310; 30; 20 INJECTION, SOLUTION INTRAVENOUS at 03:51

## 2020-11-06 RX ADMIN — ENOXAPARIN SODIUM 30 MG: 30 INJECTION SUBCUTANEOUS at 08:35

## 2020-11-06 RX ADMIN — SODIUM CHLORIDE, PRESERVATIVE FREE 10 ML: 5 INJECTION INTRAVENOUS at 06:40

## 2020-11-06 RX ADMIN — DOCUSATE SODIUM 100 MG: 100 CAPSULE ORAL at 20:38

## 2020-11-06 RX ADMIN — SODIUM CHLORIDE, PRESERVATIVE FREE 10 ML: 5 INJECTION INTRAVENOUS at 20:37

## 2020-11-06 RX ADMIN — METHOCARBAMOL TABLETS 1000 MG: 500 TABLET, COATED ORAL at 13:51

## 2020-11-06 RX ADMIN — DOCUSATE SODIUM 100 MG: 100 CAPSULE ORAL at 08:35

## 2020-11-06 RX ADMIN — OXYCODONE HYDROCHLORIDE 10 MG: 10 TABLET ORAL at 03:54

## 2020-11-06 RX ADMIN — METHOCARBAMOL TABLETS 1000 MG: 500 TABLET, COATED ORAL at 08:36

## 2020-11-06 RX ADMIN — OXYCODONE HYDROCHLORIDE 10 MG: 10 TABLET ORAL at 20:30

## 2020-11-06 RX ADMIN — PROCHLORPERAZINE EDISYLATE 10 MG: 5 INJECTION INTRAMUSCULAR; INTRAVENOUS at 06:40

## 2020-11-06 RX ADMIN — METHOCARBAMOL TABLETS 1000 MG: 500 TABLET, COATED ORAL at 20:38

## 2020-11-06 RX ADMIN — LORAZEPAM 1 MG: 2 INJECTION INTRAMUSCULAR; INTRAVENOUS at 10:46

## 2020-11-06 RX ADMIN — ACETAMINOPHEN 650 MG: 325 TABLET ORAL at 03:54

## 2020-11-06 RX ADMIN — FENTANYL CITRATE 50 MCG: 0.05 INJECTION, SOLUTION INTRAMUSCULAR; INTRAVENOUS at 01:42

## 2020-11-06 RX ADMIN — IOPAMIDOL 90 ML: 755 INJECTION, SOLUTION INTRAVENOUS at 02:11

## 2020-11-06 RX ADMIN — ONDANSETRON HYDROCHLORIDE 4 MG: 2 SOLUTION INTRAMUSCULAR; INTRAVENOUS at 02:15

## 2020-11-06 ASSESSMENT — PAIN DESCRIPTION - ONSET
ONSET: ON-GOING
ONSET: ON-GOING

## 2020-11-06 ASSESSMENT — PAIN DESCRIPTION - DESCRIPTORS
DESCRIPTORS: ACHING;SORE;SPASM
DESCRIPTORS: DISCOMFORT;CONSTANT;TENDER
DESCRIPTORS: DISCOMFORT;CONSTANT;TENDER

## 2020-11-06 ASSESSMENT — PAIN DESCRIPTION - FREQUENCY
FREQUENCY: CONTINUOUS
FREQUENCY: CONTINUOUS

## 2020-11-06 ASSESSMENT — PAIN DESCRIPTION - LOCATION
LOCATION: ARM;BACK
LOCATION: BACK;SHOULDER
LOCATION: ARM;BACK
LOCATION: SHOULDER;BACK

## 2020-11-06 ASSESSMENT — PAIN SCALES - GENERAL
PAINLEVEL_OUTOF10: 7
PAINLEVEL_OUTOF10: 8
PAINLEVEL_OUTOF10: 0
PAINLEVEL_OUTOF10: 7
PAINLEVEL_OUTOF10: 6
PAINLEVEL_OUTOF10: 0
PAINLEVEL_OUTOF10: 6
PAINLEVEL_OUTOF10: 0
PAINLEVEL_OUTOF10: 10
PAINLEVEL_OUTOF10: 7

## 2020-11-06 ASSESSMENT — PAIN - FUNCTIONAL ASSESSMENT: PAIN_FUNCTIONAL_ASSESSMENT: PREVENTS OR INTERFERES SOME ACTIVE ACTIVITIES AND ADLS

## 2020-11-06 ASSESSMENT — PAIN DESCRIPTION - PAIN TYPE
TYPE: ACUTE PAIN

## 2020-11-06 ASSESSMENT — PAIN DESCRIPTION - ORIENTATION
ORIENTATION: UPPER;MID
ORIENTATION: LEFT;LOWER
ORIENTATION: LEFT;LOWER
ORIENTATION: UPPER;MID

## 2020-11-06 NOTE — PROGRESS NOTES
Trauma Tertiary Survey    Admit Date: 11/5/2020  Hospital day 1    CC:  Trauma        Past Medical History:   Diagnosis Date    Diabetes mellitus ([facility] [address]        Alcohol pre-screening:  Men: How many times in the past year have you had 5 or more drinks in a day?  1 or more    Scheduled Meds:   sodium chloride flush  10 mL Intravenous 2 times per day    acetaminophen  650 mg Oral Q4H    methocarbamol  1,000 mg Oral 4x Daily    lidocaine  1 patch Transdermal Daily    docusate sodium  100 mg Oral BID    senna  2 tablet Oral Nightly    insulin lispro  0-6 Units Subcutaneous TID WC    insulin lispro  0-3 Units Subcutaneous Nightly    enoxaparin  30 mg Subcutaneous BID     Continuous Infusions:   lactated ringers 100 mL/hr at 11/06/20 0351    dextrose       PRN Meds:sodium chloride flush, polyethylene glycol, promethazine **OR** ondansetron, oxyCODONE **OR** oxyCODONE, HYDROmorphone **OR** HYDROmorphone, glucose, dextrose, glucagon (rDNA), dextrose    Subjective: Fall from ladder. T3,4,5 fx, neurosurgery following  Today no acute issues, doing well. States he has some back pain, but is otherwise doing well. Will advance his diet this morning. Awaiting neurosurgery evaluation. Overall he is doing well and even states he wants to go home sooner than later     Objective:     Patient Vitals for the past 8 hrs:   BP Temp Temp src Pulse Resp SpO2   11/06/20 0315 120/72 98 °F (36.7 °C) Temporal 78 14 97 %   11/06/20 0252 127/79 -- -- 83 16 98 %   11/06/20 0047 125/83 -- -- 87 16 99 %       No intake/output data recorded. I/O this shift:  In: -   Out: 550 [Urine:550]    Past Medical History:   Diagnosis Date    Diabetes mellitus ([facility] [address]        Radiology:  CT ABDOMEN PELVIS W IV CONTRAST Additional Contrast? None   Final Result   Moderately increased stool contents in the colon indicative of constipation. Mild anterior compression of the L1 vertebral body, most likely chronic in   nature.          MRI THORACIC L  Neurosurgery following   · CV: no acute issues  · Pulm: no acute issues   · GI: no acute issues, will advance to general diet   · Renal: no acute issues  · ID: no acute issue    · Endocrine: DM 1, on insulin pump at home.  On lispro  · MSK: pain in left shoulder, currently on a sling     · Heme:no acute issues     Bowel regime: senna, glycolax, colace  Pain control/Sedation: robaxin, patsy, tylenol  DVT prophylaxis:   lovenox  GI: will advance to general diet  Glucose protocol: Q4 sugar checks    Code status:    Full Code    Patient/Family update:  As available     Disposition:  General floor      Electronically signed by Je Harkins DO on 11/6/20 at 6:15 AM EST

## 2020-11-06 NOTE — ED NOTES
Bed: 15  Expected date:   Expected time:   Means of arrival:   Comments:  Kush 83 tx     Kate Seay RN  11/05/20 6220

## 2020-11-06 NOTE — PROGRESS NOTES
Dr Mary Chris notified of admission    Electronically signed by FELECIA Zamora CNP on 11/6/2020 at 12:02 PM

## 2020-11-06 NOTE — DISCHARGE SUMMARY
Physician Discharge Summary     Patient ID:  Radha Luciano  [id number]  18 y.o.  1990    Admit date: 11/5/2020    Discharge date and time: 11/7/20    Admitting Physician: Poonam Kulkarni MD     Admission Diagnoses: Fall from roof, initial encounter [W13. 2XXA]  Fall from roof, initial encounter [W13. 2XXA]    Discharge Diagnoses: Active Problems:    Fall from roof    Compression fracture of T3 vertebra (HCC)    Compression fracture of body of thoracic vertebra (HCC)  Resolved Problems:    * No resolved hospital problems. *      Admission Condition: fair    Discharged Condition: stable    Indication for Admission: fall from ladder, chronic compression fractures    Hospital Course/Procedures/Operation/treatments:   11/5-Patient presented to [facility] emergency department. They got a CT of the cervical spine which demonstrated slight wedging of C5 and C6 vertebral bodies chronic in nature. CT of the chest demonstrating fractures of T3, T4, T5, mild superior endplate compression fractures. Also reporting possible Scheuerman disease. An x-ray of the left shoulder was performed which did not demonstrate acute fracture. 11/6-Today no acute issues, doing well. States he has some back pain, but is otherwise doing well. Will advance his diet this morning. Awaiting neurosurgery evaluation. Overall he is doing well and even states he wants to go home sooner than later   11/7: Patient is doing well this morning. MRI negative for any acute fracture. Neurosurgery states he is OK for DC from their POV. Endorsing BM. Slight nausea but feels it is because he needs his insulin pump refilled.  Planning DC later today            Consults:   IP CONSULT TO TRAUMA SURGERY  IP CONSULT TO NEUROSURGERY    Significant Diagnostic Studies:   Ct Abdomen Pelvis W Iv Contrast Additional Contrast? None    Result Date: 11/6/2020  EXAMINATION: CT OF THE ABDOMEN AND PELVIS WITH CONTRAST 11/6/2020 1:59 am TECHNIQUE: CT of the abdomen and pelvis was performed with the administration of intravenous contrast. Multiplanar reformatted images are provided for review. Dose modulation, iterative reconstruction, and/or weight based adjustment of the mA/kV was utilized to reduce the radiation dose to as low as reasonably achievable. COMPARISON: None. HISTORY: ORDERING SYSTEM PROVIDED HISTORY: trauma TECHNOLOGIST PROVIDED HISTORY: Additional Contrast?->None Reason for exam:->trauma What reading provider will be dictating this exam?->CRC FINDINGS: Lower Chest: Visualized portions of the lower chest is within normal limits. Organs: The liver, gallbladder, spleen, pancreas, and bilateral adrenal glands are within normal limits. The bilateral kidneys are within normal limits. No renal cysts or masses are present. No hydronephrosis or hydroureter. GI/Bowel: Moderate increased colonic stool contents indicative of constipation. The small bowel demonstrates normal caliber and appearance. Pelvis: The urinary bladder is well filled and is unremarkable in appearance. Peritoneum/Retroperitoneum: No free fluid or free gas in the abdomen or pelvis. Bones/Soft Tissues: Mild superior compression of the L1 vertebral body with about 20% anterior height loss, this is most likely chronic in nature. Moderately increased stool contents in the colon indicative of constipation. Mild anterior compression of the L1 vertebral body, most likely chronic in nature. Discharge Exam:  Awake and alert  Follows commands  Hrt:  Regular  Lungs:  Fairly clear bilaterally  Abd:  Soft; BS active; NT/ND  Skin:  Warm/dry  Ext:  Sling to LUE       Disposition: home    In process/preliminary results:  Outstanding Order Results     No orders found from 10/7/2020 to 11/6/2020.           Patient Instructions:   Current Discharge Medication List           Details   FLUoxetine (PROZAC) 10 MG tablet Take 60 mg by mouth daily             TRAUMA SERVICES DISCHARGE INSTRUCTIONS    Call [phone], option

## 2020-11-06 NOTE — ED NOTES
Pt vomiting over in CT, started to vomit when table starting moving. V/o per Dr. Lawson Show to give 4mg zofran.      Sky Hernandez RN  11/06/20 6263

## 2020-11-06 NOTE — CARE COORDINATION
11/6/2020 - Care Coordination - transfer from [facility] ED after fall from roof - trauma consult. Neurosurgery consult ordered. Type 1 diabetic with an insulin pump. For MRI of thoracic spine. Left arm in a sling. PT/OT severino pending. Spoke with pt in room to discuss discharge planning. PCP is Dr Abena Arredondo. [facility]acy is [facility]. Lives with his parents and sisters in their 2 story home with a flight of stairs to 2nd floor where bed/bath located. Denies hx HHC, DME, or JOHN/ARU. Plan is to return home when medically stable. Family will provide transportation home. SW/CM will ashly.

## 2020-11-06 NOTE — ED PROVIDER NOTES
HPI:  11/5/20,   Time: 10:11 PM JOSE Yang is a 31 y.o. male presenting to the ED for fall, beginning earlier today. The complaint has been intermittent, moderate in severity, and worsened by nothing. The patient was hanging lights when he fell off a ladder approximately 10 to 15 feet at approximately 450 this afternoon. Patient states he fell onto his left shoulder. He states he went to [facility] and there he was found to have possible compression fractures and sent to our facility for further care. Upon arrival to the ED the patient is complaining of lower neck as well as upper back pain. Otherwise no other acute complaints. Review of Systems:   Pertinent positives and negatives are stated within HPI, all other systems reviewed and are negative.          --------------------------------------------- PAST HISTORY ---------------------------------------------  Past Medical History:  has a past medical history of Diabetes mellitus ([facility] [address] Past Surgical History:  has no past surgical history on file. Social History:  reports that he has been smoking. He has never used smokeless tobacco. He reports previous alcohol use. He reports previous drug use. Family History: family history is not on file. The patients home medications have been reviewed. Allergies: Patient has no known allergies.         ---------------------------------------------------PHYSICAL EXAM--------------------------------------    Constitutional/General: Alert and oriented x3, well appearing, non toxic in NAD  Head: Normocephalic and atraumatic  Eyes: PERRL, EOMI, conjunctive normal, sclera non icteric  Mouth: Oropharynx clear, handling secretions, no trismus, no asymmetry of the posterior oropharynx or uvular edema  Neck: C-collar placed in the ED, non tender to palpation in the midline, no stridor, no crepitus, no meningeal signs  Respiratory: Lungs clear to auscultation bilaterally, no wheezes, rales, or rhonchi. Not in respiratory distress  Cardiovascular:  Regular rate. Regular rhythm. No murmurs, gallops, or rubs. 2+ distal pulses  GI:  Abdomen Soft, Non tender, Non distended. +BS. No organomegaly, no palpable masses,  No rebound, guarding, or rigidity. Musculoskeletal: Moves all extremities x 4. Warm and well perfused, no clubbing, cyanosis, or edema. Capillary refill <3 seconds. Midline tenderness in the thoracic spine. No lumbar tenderness. No step-offs. Integument: skin warm and dry. No rashes. Neurologic: GCS 15, no focal deficits, symmetric strength 5/5 in the upper and lower extremities bilaterally  Psychiatric: Normal Affect    -------------------------------------------------- RESULTS -------------------------------------------------  I have personally reviewed all laboratory and imaging results for this patient. Results are listed below.      LABS:  Results for orders placed or performed during the hospital encounter of 11/05/20   CBC Auto Differential   Result Value Ref Range    WBC 9.5 4.5 - 11.5 E9/L    RBC 4.78 3.80 - 5.80 E12/L    Hemoglobin 14.1 12.5 - 16.5 g/dL    Hematocrit 41.3 37.0 - 54.0 %    MCV 86.4 80.0 - 99.9 fL    MCH 29.5 26.0 - 35.0 pg    MCHC 34.1 32.0 - 34.5 %    RDW 12.7 11.5 - 15.0 fL    Platelets 830 214 - 742 E9/L    MPV 9.6 7.0 - 12.0 fL    Neutrophils % 74.7 43.0 - 80.0 %    Immature Granulocytes % 0.3 0.0 - 5.0 %    Lymphocytes % 18.2 (L) 20.0 - 42.0 %    Monocytes % 6.4 2.0 - 12.0 %    Eosinophils % 0.1 0.0 - 6.0 %    Basophils % 0.3 0.0 - 2.0 %    Neutrophils Absolute 7.08 1.80 - 7.30 E9/L    Immature Granulocytes # 0.03 E9/L    Lymphocytes Absolute 1.73 1.50 - 4.00 E9/L    Monocytes Absolute 0.61 0.10 - 0.95 E9/L    Eosinophils Absolute 0.01 (L) 0.05 - 0.50 E9/L    Basophils Absolute 0.03 0.00 - 0.20 E9/L   Comprehensive Metabolic Panel   Result Value Ref Range    Sodium 128 (L) 132 - 146 mmol/L    Potassium 4.1 3.5 - 5.0 mmol/L    Chloride 95 (L) 98 - 107 mmol/L CO2 25 22 - 29 mmol/L    Anion Gap 8 7 - 16 mmol/L    Glucose 325 (H) 74 - 99 mg/dL    BUN 15 6 - 20 mg/dL    CREATININE 0.9 0.7 - 1.2 mg/dL    GFR Non-African American >60 >=60 mL/min/1.73    GFR African American >60     Calcium 8.8 8.6 - 10.2 mg/dL    Total Protein 6.6 6.4 - 8.3 g/dL    Alb 4.0 3.5 - 5.2 g/dL    Total Bilirubin 1.0 0.0 - 1.2 mg/dL    Alkaline Phosphatase 92 40 - 129 U/L    ALT 15 0 - 40 U/L    AST 21 0 - 39 U/L       RADIOLOGY:  Interpreted by Radiologist.  CT ABDOMEN PELVIS W IV CONTRAST Additional Contrast? None    (Results Pending)   MRI THORACIC SPINE WO CONTRAST    (Results Pending)         ------------------------- NURSING NOTES AND VITALS REVIEWED ---------------------------   The nursing notes within the ED encounter and vital signs as below have been reviewed by myself. /83   Pulse 87   Temp 98.7 °F (37.1 °C)   Resp 16   Ht 5' 6\" (1.676 m)   Wt 128 lb (58.1 kg)   SpO2 99%   BMI 20.66 kg/m²   Oxygen Saturation Interpretation: Normal    The patients available past medical records and past encounters were reviewed. ------------------------------ ED COURSE/MEDICAL DECISION MAKING----------------------  Medications   iopamidol (ISOVUE-370) 76 % injection 90 mL (has no administration in time range)   sodium chloride flush 0.9 % injection 10 mL (has no administration in time range)   fentaNYL (SUBLIMAZE) injection 50 mcg (50 mcg Intravenous Given 11/6/20 0142)         ED COURSE:       Medical Decision Making: This is a 46-year-old male presented to the ED after a fall. The patient was originally seen in [facility] and had CT neck and chest which showed possible compression fractures of C5-C6 as well as T3 through T 5. Patient was not in a c-collar on arrival and was placed immediately in a c-collar. Patient neurologically intact. Patient received fentanyl for pain control. Patient's labs unremarkable except for glucose of 325.   Patient will be admitted under the trauma service and undergo a CT abdomen pelvis with IV contrast as well as an MRI of the thoracic spine for which trauma will follow up on. I, Dr. Nury Jones, am the primary provider for this encounter    This patient's ED course included: a personal history and physicial examination, re-evaluation prior to disposition and multiple bedside re-evaluations    This patient has remained hemodynamically stable during their ED course. Re-Evaluations:             Re-evaluation. Patients symptoms are improving      Counseling: The emergency provider has spoken with the patient and discussed todays results, in addition to providing specific details for the plan of care and counseling regarding the diagnosis and prognosis. Questions are answered at this time and they are agreeable with the plan.       --------------------------------- IMPRESSION AND DISPOSITION ---------------------------------    IMPRESSION  1. Fall, initial encounter    2. Compression fracture of body of thoracic vertebra (HCC)    3. Hyperglycemia        DISPOSITION  Disposition: Admit to telemetry  Patient condition is stable    NOTE: This report was transcribed using voice recognition software.  Every effort was made to ensure accuracy; however, inadvertent computerized transcription errors may be present        Toy Gao DO  11/06/20 0153

## 2020-11-06 NOTE — CONSULTS
[address]                  [address] [place],  [facility]                                  CONSULTATION    PATIENT NAME: Alesia Cabrales                    :        1990  MED REC NO:   [id number]                            ROOM:       1153  ACCOUNT NO:   [de-identified]                           ADMIT DATE: 2020  PROVIDER:     Evi Concepcion MD    CONSULT DATE:  2020    REASON FOR CONSULT:  Thoracic compression fracture. HISTORY OF PRESENT ILLNESS:  The patient is a 61-year-old gentleman who  fell off a roof. Immediately after the fall, he was complaining of left  shoulder pain and subsequently developed back pain few hours after. He  describes the pain as a sharp, stabbing, aching pain. It is about 7/10. It does not radiate into his legs. He denies any new numbness, tingling  or weakness or loss of control of bowel or bladder function. PAST MEDICAL HISTORY:  Positive for diabetes, anxiety and depression. PAST SURGICAL HISTORY:  Negative. FAMILY HISTORY:  Noncontributory. SOCIAL HISTORY:  Positive for social consumption of alcoholic beverages,    HOME MEDICATIONS:  Include insulin from an insulin pump. REVIEW OF SYSTEMS:  HEENT:  Negative for headache, double vision, or  blurry vision. CARDIOVASCULAR:  Negative for chest pain, arrhythmia, or  palpitations. RESPIRATORY:  Negative for shortness of breath, asthma,  bronchitis, or pneumonia. GASTROINTESTINAL:  Positive for nausea and  vomiting. GENITOURINARY:  Negative for hematuria or dysuria. HEMATOLOGIC:  Negative for easy bleeding or bruising. INFECTIOUS:   Negative for any recent infection. MUSCULOSKELETAL:  Positive for back  pain. PSYCHIATRIC:  Positive for anxiety and depression. NEUROLOGIC:   Negative for seizure or stroke. ENDOCRINE:  Positive for diabetes.     PHYSICAL EXAMINATION:  VITAL SIGNS:  He is currently afebrile with a T-current of 36.7

## 2020-11-06 NOTE — H&P
TRAUMA HISTORY & PHYSICAL  Surgical Resident/Advance Practice Nurse  11/6/2020  2:20 AM    PRIMARY SURVEY    CHIEF COMPLAINT:  Trauma consult. Injury occurred early 11/5    Patient was hanging [place] lights on his roof when he is slipped and fell landing on his left side. He was briefly nauseous after the fall, he did not vomit. He reports pain mostly in his mid back. Some pain in his left shoulder worse with movement . He did not hit his head, did not lose consciousness. Patient presented to [facility] emergency department. They got a CT of the cervical spine which demonstrated slight wedging of C5 and C6 vertebral bodies chronic in nature. CT of the chest demonstrating fractures of T3, T4, T5, mild superior endplate compression fractures. Also reporting possible Scheuerman disease. An x-ray of the left shoulder was performed which did not demonstrate acute fracture. The patient is a type I diabetic on insulin pump. Glucose was 270 on presentation at [facility].         AIRWAY:   Airway Normal  EMS ETT Absent  Noisy respirations Absent  Retractions: Absent  Vomiting/bleeding: Absent      BREATHING:    Midaxillary breath sound left:  Normal  Midaxillary breath sound right:  Normal    Cough sound intensity:  good   FiO2: Room air  SMI performed    CIRCULATION:   Femerol pulse intensity: Strong  Palpebral conjunctiva: Pink       Vitals:    11/06/20 0047   BP: 125/83   Pulse: 87   Resp: 16   Temp:    SpO2: 99%       Vitals:    11/05/20 2204 11/06/20 0047   BP: 130/86 125/83   Pulse: 100 87   Resp: 12 16   Temp: 98.7 °F (37.1 °C)    SpO2: 99% 99%   Weight: 128 lb (58.1 kg)    Height: 5' 6\" (1.676 m)         FAST EXAM: Not performed    Central Nervous System    GCS Initial 15 minutes   Eye  Motor  Verbal 4 - Opens eyes on own  6 - Follows simple motor commands  5 - Alert and oriented 4 - Opens eyes on own  6 - Follows simple motor commands  5 - Alert and oriented     Neuromuscular blockade: No  Pupil size:  Left 4 mm    Right 4 mm  Pupil reaction: Yes    Wiggles fingers: Left Yes Right Yes  Wiggles toes: Left Yes   Right Yes    Hand grasp:   Left  Present      Right  Present  Plantar flexion: Left  Present      Right   Present    Loss of consciousness:  No  History Obtained From:  Patient & EMS  Private Medical Doctor: Unknown    Pre-exisiting Medical History:  yes    Conditions: Type 1 diabetes    Medications: Insulin pump    Allergies: NKDA    Social History:   Tobacco use:  none  Alcohol use:  social drinker  Illicit drug use:  no history of illicit drug use    Past Surgical History: No previous surgeries    Anticoagulant use:  No   Antiplatelet use:    No     NSAID use in last 72 hours: no  Taken PCN in past:  yes  Last food/drink: Early 11/5  Last tetanus: Unknown    Family History:   Not pertinent to presenting problem. Complaints:   Head:  None  Neck:   None  Chest:   Mild  Back:   Moderate  Abdomen:   None  Extremities:   Mild  Comments:     Review of systems:  All negative unless otherwise noted. SECONDARY SURVEY  Head/scalp: Atraumatic    Face: Atraumatic    Eyes/ears/nose: Atraumatic    Pharynx/mouth: Atraumatic    Neck: Atraumatic     Cervical spine tenderness:   Cervical collar in place at time of arrival  Pain:  none  ROM:  Not indicated     Chest wall:  Atraumatic    Heart:  Regular rate & rhythm    Abdomen: Atraumatic.  Soft ND  Tenderness:  none    Pelvis: Atraumatic  Tenderness: none    Thoracolumbar spine: Crepitus felt over mid thoracic area  Tenderness: Tender to palpation over mid thoracic area    Extremities:   Sensory normal  Motor normal    Distal Pulses  Left arm normal  Right arm normal  Left leg normal  Right leg normal    Capillary refill  Left arm normal  Right arm normal  Left leg normal  Right leg normal    Procedures in ED:  None    In the event of Emergency Blood Transfusion:  Due to the critical condition of this patient, I request the immediate release of blood products for emergency transfusion secondary to shock. I understand the increased risks incurred by the lack of complete transfusion testing.       Radiology: CT Cervical spine , CT Chest  and CT Abdomen     Consultations:  Neurosurgery    Admission/Diagnosis: Fall from roof, T3, T4, T5 mild superior endplate compression fractures    Plan of Treatment:    -Admission to general floor  -CT of the abdomen and pelvis with contrast  -Consult to neurosurgery, recommending MRI of thoracic spine  -N.p.o.  -IVF  -Pain control  -Nausea control  -TERT  -PT/OT    Plan discussed with Dr. Ami Parry at 11/6/2020 on 2:20 AM    Electronically signed by Yvan Tavares DO on 11/6/2020 at 2:20 AM

## 2020-11-06 NOTE — PROGRESS NOTES
Occupational Therapy    OT consult received to eval/treat and chart review complete. Hold for NS recommendations regarding compression fractures. OT to re-attempt at a later time as appropriate. Thank you.        Neil Siddiqi, OTR/L  [id number]

## 2020-11-07 VITALS
BODY MASS INDEX: 20.57 KG/M2 | SYSTOLIC BLOOD PRESSURE: 135 MMHG | DIASTOLIC BLOOD PRESSURE: 93 MMHG | RESPIRATION RATE: 16 BRPM | HEIGHT: 66 IN | OXYGEN SATURATION: 97 % | TEMPERATURE: 97.9 F | WEIGHT: 128 LBS | HEART RATE: 102 BPM

## 2020-11-07 LAB
METER GLUCOSE: 184 MG/DL (ref 74–99)
METER GLUCOSE: 217 MG/DL (ref 74–99)
METER GLUCOSE: 221 MG/DL (ref 74–99)
METER GLUCOSE: 269 MG/DL (ref 74–99)
METER GLUCOSE: 425 MG/DL (ref 74–99)

## 2020-11-07 PROCEDURE — 6360000002 HC RX W HCPCS: Performed by: NEUROLOGICAL SURGERY

## 2020-11-07 PROCEDURE — 6370000000 HC RX 637 (ALT 250 FOR IP): Performed by: STUDENT IN AN ORGANIZED HEALTH CARE EDUCATION/TRAINING PROGRAM

## 2020-11-07 PROCEDURE — 82962 GLUCOSE BLOOD TEST: CPT

## 2020-11-07 PROCEDURE — 97161 PT EVAL LOW COMPLEX 20 MIN: CPT | Performed by: PHYSICAL THERAPIST

## 2020-11-07 PROCEDURE — 6360000002 HC RX W HCPCS: Performed by: STUDENT IN AN ORGANIZED HEALTH CARE EDUCATION/TRAINING PROGRAM

## 2020-11-07 PROCEDURE — 96376 TX/PRO/DX INJ SAME DRUG ADON: CPT

## 2020-11-07 PROCEDURE — G0378 HOSPITAL OBSERVATION PER HR: HCPCS

## 2020-11-07 PROCEDURE — 99238 HOSP IP/OBS DSCHRG MGMT 30/<: CPT | Performed by: SURGERY

## 2020-11-07 PROCEDURE — 2580000003 HC RX 258: Performed by: STUDENT IN AN ORGANIZED HEALTH CARE EDUCATION/TRAINING PROGRAM

## 2020-11-07 PROCEDURE — 96372 THER/PROPH/DIAG INJ SC/IM: CPT

## 2020-11-07 PROCEDURE — 96375 TX/PRO/DX INJ NEW DRUG ADDON: CPT

## 2020-11-07 PROCEDURE — 97165 OT EVAL LOW COMPLEX 30 MIN: CPT

## 2020-11-07 RX ORDER — OXYCODONE HYDROCHLORIDE 5 MG/1
5 TABLET ORAL EVERY 6 HOURS PRN
Qty: 20 TABLET | Refills: 0 | Status: CANCELLED | OUTPATIENT
Start: 2020-11-07 | End: 2020-11-12

## 2020-11-07 RX ADMIN — ACETAMINOPHEN 650 MG: 325 TABLET ORAL at 12:32

## 2020-11-07 RX ADMIN — PROCHLORPERAZINE EDISYLATE 10 MG: 5 INJECTION INTRAMUSCULAR; INTRAVENOUS at 09:01

## 2020-11-07 RX ADMIN — ONDANSETRON 4 MG: 2 INJECTION INTRAMUSCULAR; INTRAVENOUS at 06:55

## 2020-11-07 RX ADMIN — ENOXAPARIN SODIUM 30 MG: 30 INJECTION SUBCUTANEOUS at 09:04

## 2020-11-07 RX ADMIN — ACETAMINOPHEN 650 MG: 325 TABLET ORAL at 09:03

## 2020-11-07 RX ADMIN — ACETAMINOPHEN 650 MG: 325 TABLET ORAL at 04:30

## 2020-11-07 RX ADMIN — ACETAMINOPHEN 650 MG: 325 TABLET ORAL at 00:30

## 2020-11-07 RX ADMIN — DOCUSATE SODIUM 100 MG: 100 CAPSULE ORAL at 09:04

## 2020-11-07 RX ADMIN — SODIUM CHLORIDE, PRESERVATIVE FREE 10 ML: 5 INJECTION INTRAVENOUS at 09:01

## 2020-11-07 RX ADMIN — METHOCARBAMOL TABLETS 1000 MG: 500 TABLET, COATED ORAL at 12:32

## 2020-11-07 RX ADMIN — INSULIN LISPRO 6 UNITS: 100 INJECTION, SOLUTION INTRAVENOUS; SUBCUTANEOUS at 06:44

## 2020-11-07 RX ADMIN — METHOCARBAMOL TABLETS 1000 MG: 500 TABLET, COATED ORAL at 09:03

## 2020-11-07 ASSESSMENT — PAIN SCALES - GENERAL
PAINLEVEL_OUTOF10: 3
PAINLEVEL_OUTOF10: 3
PAINLEVEL_OUTOF10: 0
PAINLEVEL_OUTOF10: 4
PAINLEVEL_OUTOF10: 4

## 2020-11-07 ASSESSMENT — PAIN DESCRIPTION - LOCATION
LOCATION: ARM;BACK

## 2020-11-07 ASSESSMENT — PAIN DESCRIPTION - ORIENTATION
ORIENTATION: LEFT;LOWER
ORIENTATION: LEFT;LOWER

## 2020-11-07 ASSESSMENT — PAIN - FUNCTIONAL ASSESSMENT: PAIN_FUNCTIONAL_ASSESSMENT: PREVENTS OR INTERFERES SOME ACTIVE ACTIVITIES AND ADLS

## 2020-11-07 ASSESSMENT — PAIN DESCRIPTION - ONSET
ONSET: ON-GOING
ONSET: ON-GOING

## 2020-11-07 ASSESSMENT — PAIN DESCRIPTION - FREQUENCY
FREQUENCY: CONTINUOUS
FREQUENCY: CONTINUOUS

## 2020-11-07 ASSESSMENT — PAIN DESCRIPTION - DESCRIPTORS
DESCRIPTORS: ACHING;SORE;NAGGING
DESCRIPTORS: ACHING;SORE;SPASM
DESCRIPTORS: ACHING;SORE;DISCOMFORT

## 2020-11-07 ASSESSMENT — PAIN DESCRIPTION - PAIN TYPE
TYPE: ACUTE PAIN

## 2020-11-07 NOTE — PROGRESS NOTES
Physical Therapy Initial Assessment     Name: Dale Almaguer  : 1990  MRN: [id number]    Referring Provider:  Rk Wilburn DO    Date of Service: 2020    Evaluating PT:  Susan Hector PT     Room #:  4065/2002-V  Diagnosis:  Fall from roof, thoracic compression fx  PMHx/PSHx:  DM, insulin pump  Procedure/Surgery:  N/A  Precautions:  N/A  Equipment Needs:  None     SUBJECTIVE:    Pt lives with family in a 2 story home with 2 stairs to enter and 1 rail. Bed is on 2nd floor and bath is on 2nd floor. Pt ambulated with no device PTA. OBJECTIVE:   Initial Evaluation  Date: 20 Treatment Short Term/ Long Term   Goals   AM-PAC 6 Clicks 98/93     Was pt agreeable to Eval/treatment? Yes      Does pt have pain?  4/5 mid back     Bed Mobility  Rolling: independent  Supine to sit: independent  Sit to supine: independent  Scooting: independent  Rolling:   Supine to sit:   Sit to supine:   Scooting:    Transfers Sit to stand: independent  Stand to sit: independent  Stand pivot: independent  Sit to stand: N/A  Stand to sit: N/A  Stand pivot: N/A   Ambulation    200 feet with no device independent  N/A   Stair negotiation: ascended and descended  10 steps with 1 rail mod independent  N/A   ROM BUE:  Defer to OT  BLE:  WNL     Strength BUE:  Defer to OT  BLE:  WNL     Balance Sitting EOB:  independent  Dynamic Standing:  Independent   Sitting EOB:  N/A  Dynamic Standing:  N/A     Pt is A & O x 3  Sensation:  Pt denies numbness and tingling to extremities  Edema:  None noted    Vitals:  Blood Pressure at rest  Blood Pressure post session    Heart Rate at rest  Heart Rate post session    SPO2 at rest  SPO2 post session      Therapeutic Exercises:  N/A    Patient education  Pt educated on role of physical therapy    Patient response to education:   Pt verbalized understanding Pt demonstrated skill Pt requires further education in this area   yes yes no     ASSESSMENT:    Comments:  Pt is independent and safe with all functional mobility. No additional physical therapy needs identified. Treatment:  Patient practiced and was instructed in the following treatment:     Pt performed functional activities as stated above    Pt's/ family goals   1. None stated    Patient and or family understand(s) diagnosis, prognosis, and plan of care. yes    PLAN OF CARE:  Discharge from physical therapy services due to pt independence with all mobility    Time in  0745  Time out  0800    Total Treatment Time  0 minutes     Evaluation Time includes thorough review of current medical information, gathering information on past medical history/social history and prior level of function, completion of standardized testing/informal observation of tasks, assessment of data and education on plan of care and goals.     CPT codes:  [x] Low Complexity PT evaluation 96713  [] Moderate Complexity PT evaluation 45067  [] High Complexity PT evaluation 12694  [] PT Re-evaluation 31512  [] Gait training 43129  minutes  [] Manual therapy 55371  minutes  [] Therapeutic activities 29973  minutes  [] Therapeutic exercises 93247  minutes  [] Neuromuscular reeducation 51700  minutes     Teetee Oliva [address]

## 2020-11-07 NOTE — PROGRESS NOTES
[facility]   ATTENDING PHYSICIAN PROGRESS NOTE     I have examined the patient, reviewed the record, and discussed the case with the APN/ Resident. I have reviewed all relevant labs and imaging data. The following summarizes my clinical findings and independent assessment. CC: fall    Pt without complaints.       Awake and alert  Follows commands  Hrt:  Regular  Lungs:  Fairly clear bilaterally  Abd:  Soft; BS active; NT/ND  Skin:  Warm/dry  Ext:  Sling to LUE    Patient Active Problem List    Diagnosis Date Noted    Fall from roof 11/06/2020    Compression fracture of T3 vertebra ([address] 11/06/2020    Compression fracture of body of thoracic vertebra (HCC)        S/p fall from roof  Back pain--MRI negative--no brace needed  Diet as tolerated  OOB/ambualte  PT/OT evals  PCDs  Discharge planning    Lisa Benton MD, FACS  11/7/2020  10:09 AM

## 2020-11-07 NOTE — PLAN OF CARE
Problem: Falls - Risk of:  Goal: Will remain free from falls  Description: Will remain free from falls  11/7/2020 1423 by Surinder Robbins RN  Outcome: Met This Shift  11/7/2020 0211 by Ramirez Damon RN  Outcome: Met This Shift  Goal: Absence of physical injury  Description: Absence of physical injury  11/7/2020 1423 by Surinder Robbins RN  Outcome: Met This Shift  11/7/2020 0211 by Ramirez Damon RN  Outcome: Met This Shift     Problem: Pain:  Goal: Pain level will decrease  Description: Pain level will decrease  11/7/2020 1423 by Surinder Robbins RN  Outcome: Met This Shift  11/7/2020 0211 by Ramirez Damon RN  Outcome: Met This Shift  Goal: Control of acute pain  Description: Control of acute pain  11/7/2020 1423 by Surinder Robbins RN  Outcome: Met This Shift  11/7/2020 0211 by Ramirez Damon RN  Outcome: Met This Shift  Goal: Control of chronic pain  Description: Control of chronic pain  11/7/2020 1423 by Surinder Robbins RN  Outcome: Met This Shift  11/7/2020 0211 by Ramirez Damon RN  Outcome: Met This Shift     Problem: Skin Integrity:  Goal: Will show no infection signs and symptoms  Description: Will show no infection signs and symptoms  11/7/2020 1423 by Surinedr Robbins RN  Outcome: Met This Shift  11/7/2020 0211 by Ramirez Damon RN  Outcome: Met This Shift  Goal: Absence of new skin breakdown  Description: Absence of new skin breakdown  11/7/2020 1423 by Surinder Robbins RN  Outcome: Met This Shift  11/7/2020 0211 by Ramirez Damon RN  Outcome: Met This Shift

## 2020-11-07 NOTE — PROGRESS NOTES
Occupational Therapy  OCCUPATIONAL THERAPY INITIAL EVALUATION      Date:2020  Patient Name: Rom Valerio  MRN: [id number]  : 1990  Room: [address] [place], [place] [address]      Evaluating OT: Citlalli Martinez OTR/L #[id number]    AM-PAC Daily Activity Raw Score: 22    Recommended Adaptive Equipment: none    Diagnosis: Fall from roof, initial encounter [W13. 2XXA]  Fall from roof, initial encounter [W13. 2XXA]  Referring Provider: Nato Zapata DO   Patient presented to ED for fall from ladder  No acute fractures    Surgery/Procedure: none  Pertinent Medical History: DM      Precautions:  Falls, LUE sling for sprain (per pt report)     Home Living: Pt lives with family in 2 story house w/ 2 TEENA (1 handrail); bed/bath on 2nd floor; full flight to 2nd floor w/ 1 handrail   Bathroom setup: tub/shower   Equipment owned: none    Prior Level of Function: Independent with ADLs , Independent with IADLs; ambulated w/ no AD  Driving: Yes  Occupation: currently not working; used to work at [employer]    Pain Level: Pt c/o no pain at this time  Cognition: A&O: 4/4; Follows 1-2 step directions   Memory:  good    Sequencing:  good    Problem solving: good    Judgement/safety: good      Functional Assessment:   Initial Eval Status  Date: 20 Treatment Status  Date: STGs/LTGs  Treatment frequency: 1-4x/wk   Feeding Independent        Grooming Modified [place] (standing at sink)     UB Dressing Minimal Assist for sling  Modified [place]    LB [address] [place] for safety  Independent    Toileting Independent       Bed Mobility  Supine to sit:  Independent   Sit to supine: Independent      Functional Transfers Independent      Functional Mobility Independent (to/from bathroom)     Balance Sitting:     Static: Independent    Dynamic:Independent  Standing: w/ no AD    Static: Independent    Dynamic: Independent     Activity Tolerance Fair- (limited by nausea)  good   Visual/  Perceptual Glasses: yes                  Hand dominance: R     Strength ROM Additional Info:    RUE  5/5  WFL good  and wfl FMC/dexterity noted during ADL tasks       LUE NT d/t sprain/pain  Proximally: limited d/t pain  Distally: WFL good  and wfl FMC/dexterity noted during ADL tasks         Hearing: WFL  Sensation:  No c/o numbness or tingling   Tone: WFL   Edema: none noted     Treatment: Upon arrival, patient lying in bed and agreeable to OT session at this time. RN approved. Therapist facilitated bed mobility, functional transfers (EOB), standing tolerance tasks and functional mobility task with no AD (cuing on posture and safety). Therapist facilitated self-care retraining: UB/LB self-care tasks(socks, sling), simulated toileting task(in bathroom)and standing grooming task(washed hands at sink) while educating pt on modified techniques, posture, safety and energy conservation techniques. Skilled monitoring of HR, O2 sats and pts response to treatment. At end of session, patient lying in bed with call light and phone within reach, all lines and tubes intact. Comments:  Overall pt demonstrated mild decreased independence and safety during completion of ADL/functional transfers/mobility tasks. Pt demonstrating good understanding of education/techniques, requiring additional training / education to increase independence for ADL tasks. Pt would benefit from continued skilled OT to increase functional independence and quality of life.       Eval Complexity: Low    Assessment of current deficits   Functional mobility [x]  ADLs [x] Strength [x]  Cognition []  Functional transfers  [x] IADLs [x] Safety Awareness [x]  Endurance [x]  Fine Motor Coordination [] Balance [x] Vision/perception [] Sensation []   Gross Motor Coordination [] ROM [x] Delirium []                  Motor Control []    Plan of Care: 1-3 days/week for 1-2 weeks PRN   [x]ADL retraining/adapted techniques and AE recommendations to increase functional independence within precautions                    [x]Energy conservation techniques to improve tolerance for selfcare routine   [x]Functional transfer/mobility training/DME recommendations for increased independence, safety and fall prevention         [x]Patient/family education to increase safety and functional independence             [x]Environmental modifications for safe mobility and completion of ADLs                             []Cognitive retraining ex's to improve problem solving skills & safe participation in ADLs/IADLs     []Sensory re-education techniques to improve extremity awareness, maintain skin integrity and improve hand function                             []Visual/Perceptual retraining  to improve body awareness and safety during transfers and ADLs  []Splinting/positioning needs to maintain joint/skin integrity and prevent contractures  [x]Therapeutic activity to improve functional performance during ADLs. [x]Therapeutic exercise to improve tolerance and functional strength for ADLs   []Balance retraining/tolerance tasks for facilitation of postural control with dynamic challenges during ADLs . []Neuromuscular re-education: facilitation of righting/equilibrium reactions, midline orientation, scapular stability/mobility, Normalization muscle     tone and facilitation active functional movement/Attention                         []Delirium prevention/treatment    []Positioning to improve functional independence  []Other:       Rehab Potential: Good for established goals     Patient / Family Goal: to go home      Patient and/or family were instructed on functional diagnosis, prognosis/goals and OT plan of care. Demonstrated good understanding.       Time In: 10:10  Time Out: 10:25  Total Treatment Time: 5 minutes    Min Units   OT Eval Low 97165  x  1   OT Eval Medium 55643      OT Eval High 74715       OT Re-Eval [id number]       Therapeutic Ex 79651 Therapeutic Activities 39221       ADL/Self Care 58581  5     Orthotic Management 94861       Neuro Re-Ed 39228       Non-Billable Time          Evaluation Time includes thorough review of current medical information, gathering information on past medical history/social history and prior level of function, completion of standardized testing/informal observation of tasks, assessment of data and education on plan of care and goals.     Asif Henley, OTR/L #[id number]

## 2020-11-07 NOTE — PROGRESS NOTES
Patient pulled out his insulin pump. Margaret Duarte will bring in new line.  Will take his BS @ 0426

## 2020-11-07 NOTE — PROGRESS NOTES
immediate release tablet 5 mg, 5 mg, Oral, Q4H PRN **OR** oxyCODONE HCl (OXY-IR) immediate release tablet 10 mg, 10 mg, Oral, Q4H PRN  methocarbamol (ROBAXIN) tablet 1,000 mg, 1,000 mg, Oral, 4x Daily  lidocaine 4 % external patch 1 patch, 1 patch, Transdermal, Daily  glucose (GLUTOSE) 40 % oral gel 15 g, 15 g, Oral, PRN  dextrose 50 % IV solution, 12.5 g, Intravenous, PRN  glucagon (rDNA) injection 1 mg, 1 mg, Intramuscular, PRN  dextrose 5 % solution, 100 mL/hr, Intravenous, PRN  docusate sodium (COLACE) capsule 100 mg, 100 mg, Oral, BID  senna (SENOKOT) tablet 17.2 mg, 2 tablet, Oral, Nightly  enoxaparin (LOVENOX) injection 30 mg, 30 mg, Subcutaneous, BID  insulin lispro (HUMALOG) injection vial 0-6 Units, 0-6 Units, Subcutaneous, Q4H  prochlorperazine (COMPAZINE) injection 10 mg, 10 mg, Intravenous, Q6H PRN    ASSESSMENT:   Chronic thoracic and lumbar compression fractures    PLAN:  -No intervention needed  -Pain control  -Okay for d/c per neurosurgery POV      Electronically signed by Leodan Stockton PA-C on 11/7/2020 at 11:49 AM

## 2020-11-07 NOTE — PROGRESS NOTES
Gave pt discharge instructions, all questions answered at this time. Pt refused wheel chair out for discharge.

## 2020-11-07 NOTE — PROGRESS NOTES
Pt is having some N/V. Pt was given Zofran 4mg at 0655 with no relief. Messaged Dr Andres Jackson regarding this. Awaiting response.

## 2020-11-07 NOTE — PROGRESS NOTES
Pt is asking if he is going to be discharged today. Messaged Dr Jack Resendez regarding this. Awaiting response.

## 2020-11-17 ENCOUNTER — TELEPHONE (OUTPATIENT)
Dept: SURGERY | Age: 30
End: 2020-11-17

## 2020-11-17 NOTE — TELEPHONE ENCOUNTER
MA returned patient phone call to schedule follow up with [facility]. MA reviewed patient chart per discharge instructions patient can follow up as needed with [facility]uma [facility]. Patient fell from roof, dx: thoracic compression fx. Patient can follow up outpatient with his PCP. Unable to leave message for patient as the voice mailbox was full.   Electronically signed by Bruna Baumgarten on 11/17/20 at 8:35 AM EST

## 2021-02-26 ENCOUNTER — HOSPITAL ENCOUNTER (INPATIENT)
Dept: HOSPITAL 83 - ED | Age: 31
LOS: 1 days | Discharge: HOME | DRG: 420 | End: 2021-02-27
Attending: INTERNAL MEDICINE | Admitting: INTERNAL MEDICINE
Payer: COMMERCIAL

## 2021-02-26 VITALS — BODY MASS INDEX: 18.83 KG/M2 | WEIGHT: 113 LBS | HEIGHT: 65 IN

## 2021-02-26 VITALS — DIASTOLIC BLOOD PRESSURE: 78 MMHG

## 2021-02-26 VITALS — SYSTOLIC BLOOD PRESSURE: 130 MMHG | DIASTOLIC BLOOD PRESSURE: 71 MMHG

## 2021-02-26 VITALS — DIASTOLIC BLOOD PRESSURE: 75 MMHG

## 2021-02-26 VITALS — DIASTOLIC BLOOD PRESSURE: 51 MMHG

## 2021-02-26 VITALS — DIASTOLIC BLOOD PRESSURE: 95 MMHG

## 2021-02-26 VITALS — DIASTOLIC BLOOD PRESSURE: 73 MMHG

## 2021-02-26 VITALS — DIASTOLIC BLOOD PRESSURE: 85 MMHG

## 2021-02-26 DIAGNOSIS — E10.10: Primary | ICD-10-CM

## 2021-02-26 DIAGNOSIS — Z79.4: ICD-10-CM

## 2021-02-26 DIAGNOSIS — F32.9: ICD-10-CM

## 2021-02-26 DIAGNOSIS — N18.1: ICD-10-CM

## 2021-02-26 DIAGNOSIS — R82.4: ICD-10-CM

## 2021-02-26 DIAGNOSIS — N17.0: ICD-10-CM

## 2021-02-26 DIAGNOSIS — E87.1: ICD-10-CM

## 2021-02-26 DIAGNOSIS — Z87.81: ICD-10-CM

## 2021-02-26 DIAGNOSIS — E80.6: ICD-10-CM

## 2021-02-26 DIAGNOSIS — D72.829: ICD-10-CM

## 2021-02-26 DIAGNOSIS — E87.8: ICD-10-CM

## 2021-02-26 DIAGNOSIS — R74.8: ICD-10-CM

## 2021-02-26 LAB
ALBUMIN SERPL-MCNC: 4.5 GM/DL (ref 3.1–4.5)
ALP SERPL-CCNC: 120 U/L (ref 45–117)
ALT SERPL W P-5'-P-CCNC: 22 U/L (ref 12–78)
AST SERPL-CCNC: 21 IU/L (ref 3–35)
BASOPHILS # BLD AUTO: 0.1 10*3/UL (ref 0–0.1)
BASOPHILS NFR BLD AUTO: 0.5 % (ref 0–1)
BUN SERPL-MCNC: 15 MG/DL (ref 7–24)
BUN SERPL-MCNC: 16 MG/DL (ref 7–24)
BUN SERPL-MCNC: 17 MG/DL (ref 7–24)
BUN SERPL-MCNC: 19 MG/DL (ref 7–24)
CHLORIDE SERPL-SCNC: 106 MMOL/L (ref 98–107)
CHLORIDE SERPL-SCNC: 107 MMOL/L (ref 98–107)
CHLORIDE SERPL-SCNC: 108 MMOL/L (ref 98–107)
CHLORIDE SERPL-SCNC: 92 MMOL/L (ref 98–107)
CREAT SERPL-MCNC: 0.96 MG/DL (ref 0.7–1.3)
CREAT SERPL-MCNC: 1 MG/DL (ref 0.7–1.3)
CREAT SERPL-MCNC: 1.06 MG/DL (ref 0.7–1.3)
CREAT SERPL-MCNC: 1.42 MG/DL (ref 0.7–1.3)
EOSINOPHIL # BLD AUTO: 0 10*3/UL (ref 0–0.4)
EOSINOPHIL # BLD AUTO: 0.1 % (ref 1–4)
ERYTHROCYTE [DISTWIDTH] IN BLOOD BY AUTOMATED COUNT: 12.2 % (ref 0–14.5)
GLUCOSE UR QL: (no result)
HCT VFR BLD AUTO: 48.1 % (ref 42–52)
KETONES UR QL STRIP: (no result)
LIPASE SERPL-CCNC: 53 U/L (ref 73–393)
LYMPHOCYTES # BLD AUTO: 1.5 10*3/UL (ref 1.3–4.4)
LYMPHOCYTES NFR BLD AUTO: 11 % (ref 27–41)
MCH RBC QN AUTO: 28.7 PG (ref 27–31)
MCHC RBC AUTO-ENTMCNC: 32.2 G/DL (ref 33–37)
MCV RBC AUTO: 88.9 FL (ref 80–94)
MONOCYTES # BLD AUTO: 0.6 10*3/UL (ref 0.1–1)
MONOCYTES NFR BLD MANUAL: 4.2 % (ref 3–9)
NEUT #: 11.6 10*3/UL (ref 2.3–7.9)
NEUT %: 82.8 % (ref 47–73)
NRBC BLD QL AUTO: 0 % (ref 0–0)
PH UR STRIP: 5 [PH] (ref 4.5–8)
PLATELET # BLD AUTO: 281 10*3/UL (ref 130–400)
PMV BLD AUTO: 9.8 FL (ref 9.6–12.3)
POTASSIUM SERPL-SCNC: 4 MMOL/L (ref 3.5–5.1)
POTASSIUM SERPL-SCNC: 4.1 MMOL/L (ref 3.5–5.1)
POTASSIUM SERPL-SCNC: 4.1 MMOL/L (ref 3.5–5.1)
POTASSIUM SERPL-SCNC: 4.7 MMOL/L (ref 3.5–5.1)
PROT SERPL-MCNC: 8 GM/DL (ref 6.4–8.2)
RBC # BLD AUTO: 5.41 10*6/UL (ref 4.5–5.9)
RBC #/AREA URNS HPF: (no result) RBC/HPF (ref 0–2)
SODIUM SERPL-SCNC: 133 MMOL/L (ref 136–145)
SODIUM SERPL-SCNC: 138 MMOL/L (ref 136–145)
SODIUM SERPL-SCNC: 139 MMOL/L (ref 136–145)
SODIUM SERPL-SCNC: 140 MMOL/L (ref 136–145)
SP GR UR: >= 1.03 (ref 1–1.03)
UROBILINOGEN UR STRIP-MCNC: 0.2 E.U./DL (ref 0–1)
WBC #/AREA URNS HPF: (no result) WBC/HPF (ref 0–5)
WBC NRBC COR # BLD AUTO: 14 10*3/UL (ref 4.8–10.8)

## 2021-02-27 VITALS — DIASTOLIC BLOOD PRESSURE: 64 MMHG | SYSTOLIC BLOOD PRESSURE: 109 MMHG

## 2021-02-27 VITALS — DIASTOLIC BLOOD PRESSURE: 56 MMHG | SYSTOLIC BLOOD PRESSURE: 102 MMHG

## 2021-02-27 VITALS — DIASTOLIC BLOOD PRESSURE: 61 MMHG

## 2021-02-27 LAB
ALBUMIN SERPL-MCNC: 3.2 GM/DL (ref 3.1–4.5)
ALP SERPL-CCNC: 78 U/L (ref 45–117)
ALT SERPL W P-5'-P-CCNC: 16 U/L (ref 12–78)
AST SERPL-CCNC: 13 IU/L (ref 3–35)
BASOPHILS # BLD AUTO: 0 10*3/UL (ref 0–0.1)
BASOPHILS NFR BLD AUTO: 0.4 % (ref 0–1)
BUN SERPL-MCNC: 15 MG/DL (ref 7–24)
CHLORIDE SERPL-SCNC: 105 MMOL/L (ref 98–107)
CHOLEST SERPL-MCNC: 105 MG/DL (ref ?–200)
CREAT SERPL-MCNC: 0.94 MG/DL (ref 0.7–1.3)
EOSINOPHIL # BLD AUTO: 0 10*3/UL (ref 0–0.4)
EOSINOPHIL # BLD AUTO: 0.3 % (ref 1–4)
ERYTHROCYTE [DISTWIDTH] IN BLOOD BY AUTOMATED COUNT: 12.8 % (ref 0–14.5)
HCT VFR BLD AUTO: 40.1 % (ref 42–52)
HDLC SERPL-MCNC: 55 MG/DL (ref 40–60)
LDLC SERPL DIRECT ASSAY-MCNC: 35 MG/DL (ref 9–159)
LYMPHOCYTES # BLD AUTO: 2.4 10*3/UL (ref 1.3–4.4)
LYMPHOCYTES NFR BLD AUTO: 24.8 % (ref 27–41)
MCH RBC QN AUTO: 29.1 PG (ref 27–31)
MCHC RBC AUTO-ENTMCNC: 33.2 G/DL (ref 33–37)
MCV RBC AUTO: 87.7 FL (ref 80–94)
MONOCYTES # BLD AUTO: 0.6 10*3/UL (ref 0.1–1)
MONOCYTES NFR BLD MANUAL: 6.7 % (ref 3–9)
NEUT #: 6.4 10*3/UL (ref 2.3–7.9)
NEUT %: 67.4 % (ref 47–73)
NRBC BLD QL AUTO: 0 % (ref 0–0)
PLATELET # BLD AUTO: 264 10*3/UL (ref 130–400)
PMV BLD AUTO: 9.8 FL (ref 9.6–12.3)
POTASSIUM SERPL-SCNC: 4.4 MMOL/L (ref 3.5–5.1)
PROT SERPL-MCNC: 6 GM/DL (ref 6.4–8.2)
RBC # BLD AUTO: 4.57 10*6/UL (ref 4.5–5.9)
SODIUM SERPL-SCNC: 138 MMOL/L (ref 136–145)
TRIGL SERPL-MCNC: 74 MG/DL (ref ?–150)
VLDLC SERPL CALC-MCNC: 15 MG/DL (ref 6–40)
WBC NRBC COR # BLD AUTO: 9.5 10*3/UL (ref 4.8–10.8)

## 2021-06-07 ENCOUNTER — HOSPITAL ENCOUNTER (EMERGENCY)
Dept: HOSPITAL 83 - ED | Age: 31
Discharge: HOME | End: 2021-06-07
Payer: COMMERCIAL

## 2021-06-07 VITALS — WEIGHT: 123 LBS | HEIGHT: 65 IN | BODY MASS INDEX: 20.49 KG/M2

## 2021-06-07 DIAGNOSIS — Z79.899: ICD-10-CM
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
## 2021-06-07 DIAGNOSIS — S00.93XA: ICD-10-CM
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
## 2021-06-07 DIAGNOSIS — S06.0X9A: ICD-10-CM

## 2021-06-07 DIAGNOSIS — S93.402A: Primary | ICD-10-CM

## 2021-07-06 ENCOUNTER — HOSPITAL ENCOUNTER (EMERGENCY)
Dept: HOSPITAL 83 - ED | Age: 31
Discharge: HOME | End: 2021-07-06
Payer: COMMERCIAL

## 2021-07-06 VITALS — BODY MASS INDEX: 21.16 KG/M2 | WEIGHT: 127 LBS | HEIGHT: 65 IN
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
## 2021-07-06 DIAGNOSIS — S00.93XA: ICD-10-CM
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
## 2021-07-06 DIAGNOSIS — Z79.899: ICD-10-CM

## 2021-07-06 DIAGNOSIS — S29.011A: Primary | ICD-10-CM
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
## 2021-07-06 LAB
ALBUMIN SERPL-MCNC: 3.5 GM/DL (ref 3.1–4.5)
ALP SERPL-CCNC: 106 U/L (ref 45–117)
ALT SERPL W P-5'-P-CCNC: 19 U/L (ref 12–78)
AST SERPL-CCNC: 16 IU/L (ref 3–35)
BASOPHILS # BLD AUTO: 0 10*3/UL (ref 0–0.1)
BASOPHILS NFR BLD AUTO: 0.8 % (ref 0–1)
BUN SERPL-MCNC: 8 MG/DL (ref 7–24)
CHLORIDE SERPL-SCNC: 104 MMOL/L (ref 98–107)
CREAT SERPL-MCNC: 0.88 MG/DL (ref 0.7–1.3)
EOSINOPHIL # BLD AUTO: 0 10*3/UL (ref 0–0.4)
EOSINOPHIL # BLD AUTO: 0.6 % (ref 1–4)
ERYTHROCYTE [DISTWIDTH] IN BLOOD BY AUTOMATED COUNT: 11.9 % (ref 0–14.5)
GLUCOSE UR QL: (no result)
HCT VFR BLD AUTO: 43.3 % (ref 42–52)
LIPASE SERPL-CCNC: 88 U/L (ref 73–393)
LYMPHOCYTES # BLD AUTO: 1.7 10*3/UL (ref 1.3–4.4)
LYMPHOCYTES NFR BLD AUTO: 33.6 % (ref 27–41)
MCH RBC QN AUTO: 29.4 PG (ref 27–31)
MCHC RBC AUTO-ENTMCNC: 33.5 G/DL (ref 33–37)
MCV RBC AUTO: 87.8 FL (ref 80–94)
MONOCYTES # BLD AUTO: 0.3 10*3/UL (ref 0.1–1)
MONOCYTES NFR BLD MANUAL: 6.2 % (ref 3–9)
NEUT #: 3 10*3/UL (ref 2.3–7.9)
NEUT %: 58.6 % (ref 47–73)
NRBC BLD QL AUTO: 0 % (ref 0–0)
PH UR STRIP: 7 [PH] (ref 4.5–8)
PLATELET # BLD AUTO: 236 10*3/UL (ref 130–400)
PMV BLD AUTO: 9.3 FL (ref 9.6–12.3)
POTASSIUM SERPL-SCNC: 3.9 MMOL/L (ref 3.5–5.1)
PROT SERPL-MCNC: 7 GM/DL (ref 6.4–8.2)
RBC # BLD AUTO: 4.93 10*6/UL (ref 4.5–5.9)
RBC #/AREA URNS HPF: (no result) RBC/HPF (ref 0–2)
SODIUM SERPL-SCNC: 137 MMOL/L (ref 136–145)
SP GR UR: <= 1.005 (ref 1–1.03)
TROPONIN I SERPL-MCNC: < 0.015 NG/ML (ref ?–0.04)
UROBILINOGEN UR STRIP-MCNC: 1 E.U./DL (ref 0–1)
WBC #/AREA URNS HPF: (no result) WBC/HPF (ref 0–5)
WBC NRBC COR # BLD AUTO: 5.2 10*3/UL (ref 4.8–10.8)

## 2021-09-30 ENCOUNTER — HOSPITAL ENCOUNTER (OUTPATIENT)
Dept: HOSPITAL 83 - RAD | Age: 31
Discharge: HOME | End: 2021-09-30
Attending: PHYSICIAN ASSISTANT
Payer: COMMERCIAL

## 2021-09-30 DIAGNOSIS — E10.9: ICD-10-CM

## 2021-09-30 DIAGNOSIS — M54.5: Primary | ICD-10-CM

## 2021-11-03 ENCOUNTER — HOSPITAL ENCOUNTER (OUTPATIENT)
Dept: HOSPITAL 83 - ORTHO | Age: 31
Discharge: HOME | End: 2021-11-03
Attending: ORTHOPAEDIC SURGERY
Payer: COMMERCIAL

## 2021-11-03 DIAGNOSIS — M25.551: Primary | ICD-10-CM

## 2022-01-14 ENCOUNTER — HOSPITAL ENCOUNTER (OUTPATIENT)
Dept: HOSPITAL 83 - COVID19 | Age: 32
Discharge: HOME | End: 2022-01-14
Payer: COMMERCIAL

## 2022-01-14 DIAGNOSIS — U07.1: Primary | ICD-10-CM

## 2022-01-18 ENCOUNTER — HOSPITAL ENCOUNTER (OUTPATIENT)
Dept: HOSPITAL 83 - COVID19 | Age: 32
Discharge: HOME | End: 2022-01-18
Attending: STUDENT IN AN ORGANIZED HEALTH CARE EDUCATION/TRAINING PROGRAM
Payer: COMMERCIAL

## 2022-01-18 DIAGNOSIS — Z20.822: Primary | ICD-10-CM

## 2022-03-29 ENCOUNTER — HOSPITAL ENCOUNTER (EMERGENCY)
Dept: HOSPITAL 83 - ED | Age: 32
Discharge: HOME | End: 2022-03-29
Payer: COMMERCIAL

## 2022-03-29 VITALS — BODY MASS INDEX: 21.16 KG/M2 | WEIGHT: 127 LBS | HEIGHT: 65 IN
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
## 2022-03-29 DIAGNOSIS — S46.911A: Primary | ICD-10-CM
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
## 2022-03-29 DIAGNOSIS — Z79.899: ICD-10-CM

## 2022-11-23 ENCOUNTER — HOSPITAL ENCOUNTER (EMERGENCY)
Dept: HOSPITAL 83 - ED | Age: 32
Discharge: HOME | End: 2022-11-23
Payer: COMMERCIAL

## 2022-11-23 VITALS — HEIGHT: 60 IN | WEIGHT: 127 LBS
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
## 2022-11-23 DIAGNOSIS — S90.121A: ICD-10-CM

## 2022-11-23 DIAGNOSIS — S90.31XA: Primary | ICD-10-CM
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
## 2022-11-23 DIAGNOSIS — E10.9: ICD-10-CM
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
## 2022-11-23 DIAGNOSIS — Z79.899: ICD-10-CM

## 2023-04-21 ENCOUNTER — HOSPITAL ENCOUNTER (EMERGENCY)
Dept: HOSPITAL 83 - ED | Age: 33
Discharge: HOME | End: 2023-04-21
Payer: COMMERCIAL

## 2023-04-21 VITALS — WEIGHT: 123 LBS | HEIGHT: 60 IN

## 2023-04-21 DIAGNOSIS — R55: Primary | ICD-10-CM

## 2023-04-21 DIAGNOSIS — Z79.899: ICD-10-CM

## 2023-04-21 LAB
ALP SERPL-CCNC: 73 U/L (ref 46–116)
ALT SERPL W P-5'-P-CCNC: 10 U/L (ref 10–49)
APTT PPP: 24.5 SECONDS (ref 20–32.1)
BASOPHILS # BLD AUTO: 0 10*3/UL (ref 0–0.1)
BASOPHILS NFR BLD AUTO: 0.5 % (ref 0–1)
BUN SERPL-MCNC: 12 MG/DL (ref 9–23)
CHLORIDE SERPL-SCNC: 103 MMOL/L (ref 98–107)
EOSINOPHIL # BLD AUTO: 0.1 10*3/UL (ref 0–0.4)
EOSINOPHIL # BLD AUTO: 0.9 % (ref 1–4)
ERYTHROCYTE [DISTWIDTH] IN BLOOD BY AUTOMATED COUNT: 12.6 % (ref 0–14.5)
HCT VFR BLD AUTO: 43.5 % (ref 42–52)
INR BLD: 1.2 (ref 2–3.5)
LIPASE SERPL-CCNC: 28 U/L (ref 12–53)
LYMPHOCYTES # BLD AUTO: 2 10*3/UL (ref 1.3–4.4)
LYMPHOCYTES NFR BLD AUTO: 36.6 % (ref 27–41)
MCH RBC QN AUTO: 29.5 PG (ref 27–31)
MCHC RBC AUTO-ENTMCNC: 33.1 G/DL (ref 33–37)
MCV RBC AUTO: 89.1 FL (ref 80–94)
MONOCYTES # BLD AUTO: 0.5 10*3/UL (ref 0.1–1)
MONOCYTES NFR BLD MANUAL: 8.8 % (ref 3–9)
NEUT #: 3 10*3/UL (ref 2.3–7.9)
NEUT %: 53 % (ref 47–73)
NRBC BLD QL AUTO: 0 % (ref 0–0)
PLATELET # BLD AUTO: 230 10*3/UL (ref 130–400)
PMV BLD AUTO: 9.2 FL (ref 9.6–12.3)
POTASSIUM SERPL-SCNC: 3.9 MMOL/L (ref 3.4–5.1)
PROT SERPL-MCNC: 6.8 GM/DL (ref 6–8)
RBC # BLD AUTO: 4.88 10*6/UL (ref 4.5–5.9)
WBC NRBC COR # BLD AUTO: 5.6 10*3/UL (ref 4.8–10.8)

## 2023-10-23 ENCOUNTER — HOSPITAL ENCOUNTER (OUTPATIENT)
Dept: HOSPITAL 83 - LAB | Age: 33
Discharge: HOME | End: 2023-10-23
Attending: PHYSICIAN ASSISTANT
Payer: COMMERCIAL

## 2023-10-23 DIAGNOSIS — F51.04: ICD-10-CM

## 2023-10-23 DIAGNOSIS — M25.50: ICD-10-CM

## 2023-10-23 DIAGNOSIS — M54.2: ICD-10-CM

## 2023-10-23 DIAGNOSIS — H92.01: ICD-10-CM

## 2023-10-23 DIAGNOSIS — E10.9: Primary | ICD-10-CM

## 2023-10-24 LAB — CCP IGA+IGG SERPL IA-ACNC: 5 UNITS (ref 0–19)

## 2023-10-25 LAB
APCR PPP: 3 RATIO (ref 2.2–3.5)
AT III PPP CHRO-ACNC: 102 % (ref 75–135)
PLG PPP CHRO-ACNC: 91 % (ref 70–150)
PROT S ACT/NOR PPP: 108 % (ref 63–140)

## 2023-11-21 ENCOUNTER — HOSPITAL ENCOUNTER (OUTPATIENT)
Dept: HOSPITAL 83 - US | Age: 33
Discharge: HOME | End: 2023-11-21
Attending: PODIATRIST
Payer: COMMERCIAL

## 2023-11-21 DIAGNOSIS — M79.605: Primary | ICD-10-CM

## 2025-01-29 ENCOUNTER — HOSPITAL ENCOUNTER (EMERGENCY)
Dept: HOSPITAL 83 - ED | Age: 35
Discharge: HOME | End: 2025-01-29
Payer: COMMERCIAL

## 2025-01-29 VITALS — WEIGHT: 128 LBS | HEIGHT: 60 IN

## 2025-01-29 DIAGNOSIS — R11.2: ICD-10-CM

## 2025-01-29 DIAGNOSIS — Z20.822: ICD-10-CM

## 2025-01-29 DIAGNOSIS — E10.65: ICD-10-CM

## 2025-01-29 DIAGNOSIS — E87.1: ICD-10-CM

## 2025-01-29 DIAGNOSIS — E87.5: ICD-10-CM

## 2025-01-29 DIAGNOSIS — Z79.4: ICD-10-CM

## 2025-01-29 DIAGNOSIS — B34.9: Primary | ICD-10-CM

## 2025-01-29 DIAGNOSIS — F32.A: ICD-10-CM

## 2025-01-29 LAB
ALP SERPL-CCNC: 71 U/L (ref 46–116)
ALT SERPL W P-5'-P-CCNC: 9 U/L (ref 5–49)
BASOPHILS # BLD AUTO: 0 10*3/UL (ref 0–0.1)
BASOPHILS NFR BLD AUTO: 0.3 % (ref 0–1)
BUN SERPL-MCNC: 10 MG/DL (ref 9–23)
CHLORIDE SERPL-SCNC: 101 MMOL/L (ref 98–107)
EOSINOPHIL # BLD AUTO: 0 % (ref 1–4)
EOSINOPHIL # BLD AUTO: 0 10*3/UL (ref 0–0.4)
ERYTHROCYTE [DISTWIDTH] IN BLOOD BY AUTOMATED COUNT: 12.8 % (ref 0–14.5)
ETHANOL SERPL-MCNC: < 3 MG/DL (ref ?–3)
HCT VFR BLD AUTO: 43.5 % (ref 42–52)
MCH RBC QN AUTO: 29.3 PG (ref 27–31)
MCHC RBC AUTO-ENTMCNC: 33.1 G/DL (ref 33–37)
MCV RBC AUTO: 88.4 FL (ref 80–94)
MONOCYTES # BLD AUTO: 0.5 10*3/UL (ref 0.1–1)
MONOCYTES NFR BLD MANUAL: 4.5 % (ref 3–9)
NEUT #: 10.4 10*3/UL (ref 2.3–7.9)
NEUT %: 90 % (ref 47–73)
NRBC BLD QL AUTO: 0 10*3/UL (ref 0–0)
PLATELET # BLD AUTO: 211 10*3/UL (ref 130–400)
PMV BLD AUTO: 9.4 FL (ref 9.6–12.3)
POTASSIUM SERPL-SCNC: 4.6 MMOL/L (ref 3.4–5.1)
PROT SERPL-MCNC: 7.7 GM/DL (ref 6–8)
RBC # BLD AUTO: 4.92 10*6/UL (ref 4.5–5.9)
WBC NRBC COR # BLD AUTO: 11.5 10*3/UL (ref 4.8–10.8)

## 2025-07-24 ENCOUNTER — HOSPITAL ENCOUNTER (OUTPATIENT)
Dept: HOSPITAL 83 - LAB | Age: 35
Discharge: HOME | End: 2025-07-24
Attending: INTERNAL MEDICINE
Payer: COMMERCIAL

## 2025-07-24 DIAGNOSIS — E10.9: Primary | ICD-10-CM

## 2025-07-24 LAB
ALP SERPL-CCNC: 73 U/L (ref 46–116)
ALT SERPL W P-5'-P-CCNC: 18 U/L (ref 5–49)
BUN SERPL-MCNC: 11 MG/DL (ref 9–23)
CHLORIDE SERPL-SCNC: 103 MMOL/L (ref 98–107)
CHOLEST SERPL-MCNC: 138 MG/DL (ref ?–200)
LDLC SERPL DIRECT ASSAY-MCNC: 67 MG/DL (ref 9–159)
POTASSIUM SERPL-SCNC: 4.1 MMOL/L (ref 3.4–5.1)
PROT SERPL-MCNC: 7 G/L (ref 5.7–8.2)
TRIGL SERPL-MCNC: 35 MG/DL (ref ?–150)